# Patient Record
Sex: FEMALE | Race: WHITE | NOT HISPANIC OR LATINO | Employment: OTHER | URBAN - METROPOLITAN AREA
[De-identification: names, ages, dates, MRNs, and addresses within clinical notes are randomized per-mention and may not be internally consistent; named-entity substitution may affect disease eponyms.]

---

## 2019-03-07 ENCOUNTER — TELEPHONE (OUTPATIENT)
Dept: SCHEDULING | Facility: IMAGING CENTER | Age: 64
End: 2019-03-07

## 2019-04-09 ENCOUNTER — OFFICE VISIT (OUTPATIENT)
Dept: MEDICAL GROUP | Facility: PHYSICIAN GROUP | Age: 64
End: 2019-04-09
Payer: MEDICARE

## 2019-04-09 ENCOUNTER — TELEPHONE (OUTPATIENT)
Dept: MEDICAL GROUP | Facility: PHYSICIAN GROUP | Age: 64
End: 2019-04-09

## 2019-04-09 VITALS
HEART RATE: 77 BPM | HEIGHT: 64 IN | OXYGEN SATURATION: 97 % | SYSTOLIC BLOOD PRESSURE: 110 MMHG | WEIGHT: 138 LBS | RESPIRATION RATE: 16 BRPM | TEMPERATURE: 98.2 F | DIASTOLIC BLOOD PRESSURE: 64 MMHG | BODY MASS INDEX: 23.56 KG/M2

## 2019-04-09 DIAGNOSIS — Z78.0 POST-MENOPAUSAL: ICD-10-CM

## 2019-04-09 DIAGNOSIS — I48.91 ATRIAL FIBRILLATION, UNSPECIFIED TYPE (HCC): ICD-10-CM

## 2019-04-09 DIAGNOSIS — E78.2 MIXED HYPERLIPIDEMIA: ICD-10-CM

## 2019-04-09 DIAGNOSIS — F51.01 PRIMARY INSOMNIA: ICD-10-CM

## 2019-04-09 DIAGNOSIS — F32.A ANXIETY AND DEPRESSION: ICD-10-CM

## 2019-04-09 DIAGNOSIS — I50.30 DIASTOLIC CONGESTIVE HEART FAILURE, UNSPECIFIED HF CHRONICITY (HCC): ICD-10-CM

## 2019-04-09 DIAGNOSIS — F41.9 ANXIETY AND DEPRESSION: ICD-10-CM

## 2019-04-09 DIAGNOSIS — I10 ESSENTIAL HYPERTENSION: ICD-10-CM

## 2019-04-09 PROCEDURE — 99204 OFFICE O/P NEW MOD 45 MIN: CPT | Performed by: PHYSICIAN ASSISTANT

## 2019-04-09 RX ORDER — FUROSEMIDE 20 MG/1
20 TABLET ORAL 2 TIMES DAILY
COMMUNITY
End: 2019-04-09 | Stop reason: SDUPTHER

## 2019-04-09 RX ORDER — FUROSEMIDE 20 MG/1
20 TABLET ORAL 2 TIMES DAILY
Qty: 90 TAB | Refills: 2 | Status: SHIPPED | OUTPATIENT
Start: 2019-04-09 | End: 2019-05-31

## 2019-04-09 RX ORDER — ATORVASTATIN CALCIUM 20 MG/1
20 TABLET, FILM COATED ORAL EVERY EVENING
Qty: 90 TAB | Refills: 2 | Status: SHIPPED | OUTPATIENT
Start: 2019-04-09

## 2019-04-09 RX ORDER — METOPROLOL SUCCINATE 100 MG/1
100 TABLET, EXTENDED RELEASE ORAL DAILY
Qty: 90 TAB | Refills: 2 | Status: SHIPPED | OUTPATIENT
Start: 2019-04-09

## 2019-04-09 RX ORDER — SERTRALINE HYDROCHLORIDE 100 MG/1
100 TABLET, FILM COATED ORAL DAILY
COMMUNITY
End: 2019-04-09 | Stop reason: SDUPTHER

## 2019-04-09 RX ORDER — SERTRALINE HYDROCHLORIDE 100 MG/1
100 TABLET, FILM COATED ORAL DAILY
Qty: 90 TAB | Refills: 2 | Status: SHIPPED | OUTPATIENT
Start: 2019-04-09 | End: 2019-10-22 | Stop reason: SDUPTHER

## 2019-04-09 RX ORDER — TRAZODONE HYDROCHLORIDE 100 MG/1
100 TABLET ORAL EVERY EVENING
Qty: 90 TAB | Refills: 2 | Status: SHIPPED | OUTPATIENT
Start: 2019-04-09 | End: 2020-04-16

## 2019-04-09 RX ORDER — LISINOPRIL 20 MG/1
20 TABLET ORAL EVERY EVENING
Qty: 90 TAB | Refills: 2 | Status: SHIPPED | OUTPATIENT
Start: 2019-04-09 | End: 2022-06-01

## 2019-04-09 RX ORDER — ESTRADIOL 1 MG/1
1 TABLET ORAL DAILY
COMMUNITY
End: 2019-04-09 | Stop reason: SDUPTHER

## 2019-04-09 RX ORDER — ESTRADIOL 1 MG/1
1 TABLET ORAL DAILY
Qty: 30 TAB | Refills: 2 | Status: SHIPPED | OUTPATIENT
Start: 2019-04-09 | End: 2019-10-22

## 2019-04-09 ASSESSMENT — PATIENT HEALTH QUESTIONNAIRE - PHQ9: CLINICAL INTERPRETATION OF PHQ2 SCORE: 0

## 2019-04-09 NOTE — TELEPHONE ENCOUNTER
Pharmacist called and stated pt has been taking furosemide QD, new Rx that was sent today was BID.  Would you like Rx to be BID, or QD?

## 2019-04-09 NOTE — LETTER
[facility]  Astrid Ly P.A.-C.  [address]  [place] [address]  Fax: [phone]   Authorization for Release/Disclosure of   Protected Health Information   Name: IRAM SAVAGE : 1955 SSN: [id number]   Address: [address] [place] [address] [place] [address] Phone:    [phone] (home)    I authorize the entity listed below to release/disclose the PHI below to:   [facility]/Astrid Ly P.A.-C. and Astrid Ly P.A.-C.   Provider or Entity Name:     Address   City, State, Zip   Phone:      Fax:     Reason for request: continuity of care   Information to be released:    [  ] LAST COLONOSCOPY,  including any PATH REPORT and follow-up  [  ] LAST FIT/COLOGUARD RESULT [  ] LAST DEXA  [  ] LAST MAMMOGRAM  [  ] LAST PAP  [  ] LAST LABS [  ] RETINA EXAM REPORT  [  ] IMMUNIZATION RECORDS  [ x ] Release all info      [  ] Check here and initial the line next to each item to release ALL health information INCLUDING  _____ Care and treatment for drug and / or alcohol abuse  _____ HIV testing, infection status, or AIDS  _____ Genetic Testing    DATES OF SERVICE OR TIME PERIOD TO BE DISCLOSED: _____________  I understand and acknowledge that:  * This Authorization may be revoked at any time by you in writing, except if your health information has already been used or disclosed.  * Your health information that will be used or disclosed as a result of you signing this authorization could be re-disclosed by the recipient. If this occurs, your re-disclosed health information may no longer be protected by State or Federal laws.  * You may refuse to sign this Authorization. Your refusal will not affect your ability to obtain treatment.  * This Authorization becomes effective upon signing and will  on (date) __________.      If no date is indicated, this Authorization will  one (1) year from the signature date.    Name: Iram Savage    Signature:   Date:     2019       PLEASE FAX  REQUESTED RECORDS BACK TO: [phone]

## 2019-04-10 ENCOUNTER — HOSPITAL ENCOUNTER (OUTPATIENT)
Dept: LAB | Facility: MEDICAL CENTER | Age: 64
End: 2019-04-10
Attending: PHYSICIAN ASSISTANT
Payer: MEDICARE

## 2019-04-10 DIAGNOSIS — E78.2 MIXED HYPERLIPIDEMIA: ICD-10-CM

## 2019-04-10 DIAGNOSIS — I10 ESSENTIAL HYPERTENSION: ICD-10-CM

## 2019-04-10 LAB
BASOPHILS # BLD AUTO: 0.8 % (ref 0–1.8)
BASOPHILS # BLD: 0.08 K/UL (ref 0–0.12)
EOSINOPHIL # BLD AUTO: 0.97 K/UL (ref 0–0.51)
EOSINOPHIL NFR BLD: 10.1 % (ref 0–6.9)
ERYTHROCYTE [DISTWIDTH] IN BLOOD BY AUTOMATED COUNT: 47.2 FL (ref 35.9–50)
HCT VFR BLD AUTO: 48.3 % (ref 37–47)
HGB BLD-MCNC: 16.1 G/DL (ref 12–16)
IMM GRANULOCYTES # BLD AUTO: 0.02 K/UL (ref 0–0.11)
IMM GRANULOCYTES NFR BLD AUTO: 0.2 % (ref 0–0.9)
LYMPHOCYTES # BLD AUTO: 3.47 K/UL (ref 1–4.8)
LYMPHOCYTES NFR BLD: 36.2 % (ref 22–41)
MCH RBC QN AUTO: 33.5 PG (ref 27–33)
MCHC RBC AUTO-ENTMCNC: 33.3 G/DL (ref 33.6–35)
MCV RBC AUTO: 100.4 FL (ref 81.4–97.8)
MONOCYTES # BLD AUTO: 0.61 K/UL (ref 0–0.85)
MONOCYTES NFR BLD AUTO: 6.4 % (ref 0–13.4)
NEUTROPHILS # BLD AUTO: 4.43 K/UL (ref 2–7.15)
NEUTROPHILS NFR BLD: 46.3 % (ref 44–72)
NRBC # BLD AUTO: 0 K/UL
NRBC BLD-RTO: 0 /100 WBC
PLATELET # BLD AUTO: 225 K/UL (ref 164–446)
PMV BLD AUTO: 11.5 FL (ref 9–12.9)
RBC # BLD AUTO: 4.81 M/UL (ref 4.2–5.4)
WBC # BLD AUTO: 9.6 K/UL (ref 4.8–10.8)

## 2019-04-10 PROCEDURE — 80061 LIPID PANEL: CPT

## 2019-04-10 PROCEDURE — 80053 COMPREHEN METABOLIC PANEL: CPT

## 2019-04-10 PROCEDURE — 85025 COMPLETE CBC W/AUTO DIFF WBC: CPT

## 2019-04-10 PROCEDURE — 36415 COLL VENOUS BLD VENIPUNCTURE: CPT

## 2019-04-11 LAB
ALBUMIN SERPL BCP-MCNC: 4 G/DL (ref 3.2–4.9)
ALBUMIN/GLOB SERPL: 1.6 G/DL
ALP SERPL-CCNC: 71 U/L (ref 30–99)
ALT SERPL-CCNC: 19 U/L (ref 2–50)
ANION GAP SERPL CALC-SCNC: 8 MMOL/L (ref 0–11.9)
AST SERPL-CCNC: 21 U/L (ref 12–45)
BILIRUB SERPL-MCNC: 0.5 MG/DL (ref 0.1–1.5)
BUN SERPL-MCNC: 19 MG/DL (ref 8–22)
CALCIUM SERPL-MCNC: 8.7 MG/DL (ref 8.5–10.5)
CHLORIDE SERPL-SCNC: 108 MMOL/L (ref 96–112)
CHOLEST SERPL-MCNC: 139 MG/DL (ref 100–199)
CO2 SERPL-SCNC: 28 MMOL/L (ref 20–33)
CREAT SERPL-MCNC: 0.75 MG/DL (ref 0.5–1.4)
FASTING STATUS PATIENT QL REPORTED: NORMAL
GLOBULIN SER CALC-MCNC: 2.5 G/DL (ref 1.9–3.5)
GLUCOSE SERPL-MCNC: 86 MG/DL (ref 65–99)
HDLC SERPL-MCNC: 43 MG/DL
LDLC SERPL CALC-MCNC: 63 MG/DL
POTASSIUM SERPL-SCNC: 3.7 MMOL/L (ref 3.6–5.5)
PROT SERPL-MCNC: 6.5 G/DL (ref 6–8.2)
SODIUM SERPL-SCNC: 144 MMOL/L (ref 135–145)
TRIGL SERPL-MCNC: 164 MG/DL (ref 0–149)

## 2019-04-15 NOTE — PROGRESS NOTES
Chief Complaint   Patient presents with   • Establish Care     medication refills, pt would like to discuss trazadone       HISTORY OF THE PRESENT ILLNESS: Iram Savage is a 64 y.o. female new patient to our practice. This pleasant patient is here today to establish care and to discuss the evaluation and management of:    Patient is a pleasant 64-year-old female here today to establish care.  Patient has multiple complaints.  She tells me that she has a positive medical history for hypertension, hyperlipidemia, diastolic congestive heart failure, atrial fibrillation, postmenopausal symptoms, anxiety and depression.  Patient is requesting medication refills and would like to discuss insomnia.    She tells me recently she was seen at [facility] on 4/26/2019 and diagnosed with A. Fib and diastolic congestive heart failure.  States she was prescribed an anticoagulation medication but it was too expensive so she was provided samples of Eliquis 5 mg tab and has been taking to Eliquis 5 mg tab once daily.  States she was referred to a pulmonologist Dr. Nathan but he told her that he could not see her until a chest CT scan was completed.  Patient denies being diagnosed with pulmonary embolisms when hospitalized.  She mentions that she is also prescribed ipratropium twice a day but only takes medication intermittently.  States she has appointment with Dr. Curiel cardiologist on 4/26/2019.    Blood pressure is controlled.  Patient's blood pressure during today's appointment 110/64  mmHg.  She denies monitoring blood pressure at home.  States she takes lisinopril 20 mg tab once daily, Lasix 20 mg tab twice daily, metoprolol  mg tab once daily, and clonidine 0.1 mg tab twice daily.  Patient is also taking potassium 10 mEq tab once daily.  She mentions that she has been out of potassium for the past 3 weeks.  States she has been compliant with her other medications and experiences no side effects or complications  or medication.  Denies chest pain, shortness of breath, heart palpitations, dizziness, syncope, severe headache, vision changes, peripheral edema.    States of anxiety and depression she takes Zoloft 100 mg tab once daily.  States she has been taking this medication for several years and experience is no side effects or complications or medication.  States symptoms are managed with medication.  Denies side effects or complications of medication.  Denies homicidal or suicidal ideation.    Patient states she had a total hysterectomy in 2005 and at that time was placed on estradiol 1 mg tab once daily.  States she has been taking medication consistently since then.  No history or family history of breast cancer.  On average she has 1-2 hot flashes per day.  Patient would like to continue estradiol.  Discussed risk associated with hormone replacement.  Patient does not want to discontinue medication at this time.  Will reassess during follow-up appointment.    Patient states she is been having difficulty falling and staying asleep.  States she wakes up often.  Admits that due to lack of sleep she is feeling tired throughout the day.  Patient is inquiring about treatment options.    Past Medical History:   Diagnosis Date   • Broken heart syndrome 2014   • High cholesterol    • Hypertension    • Pancreatitis 2013   • Psychiatric problem        Past Surgical History:   Procedure Laterality Date   • BREAST IMPLANT REVISION Bilateral 12/14/2016    Procedure: BREAST IMPLANT - EXCHANGE;  Surgeon: Gabbie Orr M.D.;  Location: [facility];  Service:    • MASTOPEXY  1/28/2015    Performed by Gabbie Orr M.D. at [facility]   • BREAST IMPLANT REMOVAL  1/28/2015    Performed by Gabbie Orr M.D. at [facility]   • MAMMOPLASTY AUGMENTATION  1/28/2015    Performed by Gabbie Orr M.D. at [facility]   • GYN SURGERY  2005    HYSTERECTOMY   • OTHER ORTHOPEDIC  SURGERY      ORIF RIGHT WRIST   • PB ENLARGE BREAST      X 2   • TONSILLECTOMY      CHILD       No family status information on file.   History reviewed. No pertinent family history.    Social History   Substance Use Topics   • Smoking status: Former Smoker     Packs/day: 0.25     Types: Cigarettes     Start date: 1/1/2014   • Smokeless tobacco: Never Used   • Alcohol use Yes      Comment: 3 drinks per week       Allergies: Iodine and Sulfa drugs    Current Outpatient Prescriptions Ordered in [facility]   Medication Sig Dispense Refill   • lisinopril (PRINIVIL) 20 MG Tab Take 1 Tab by mouth every evening. 90 Tab 2   • furosemide (LASIX) 20 MG Tab Take 1 Tab by mouth 2 times a day. 90 Tab 2   • sertraline (ZOLOFT) 100 MG Tab Take 1 Tab by mouth every day. 90 Tab 2   • metoprolol SR (TOPROL XL) 100 MG TABLET SR 24 HR Take 1 Tab by mouth every day. 90 Tab 2   • atorvastatin (LIPITOR) 20 MG Tab Take 1 Tab by mouth every evening. 90 Tab 2   • estradiol (ESTRACE) 1 MG Tab Take 1 Tab by mouth every day. 30 Tab 2   • traZODone (DESYREL) 100 MG Tab Take 1 Tab by mouth every evening. 90 Tab 2   • apixaban (ELIQUIS) 5mg Tab Take 1 Tab by mouth 2 Times a Day. 120 Tab 1   • clonidine (CATAPRES) 0.1 MG Tab Take 0.1 mg by mouth 2 times a day.       No current [facility]-ordered facility-administered medications on file.        Review of Systems   Constitutional: Negative for fever, chills, weight loss and malaise/fatigue.   HENT: Negative for ear pain, nosebleeds, congestion, sore throat and neck pain.    Eyes: Negative for blurred vision.   Respiratory: Negative for cough, sputum production, shortness of breath and wheezing.    Cardiovascular: Negative for chest pain, palpitations, orthopnea and leg swelling.   Gastrointestinal: Negative for heartburn, nausea, vomiting and abdominal pain.   Genitourinary: Negative for dysuria, urgency and frequency.   Musculoskeletal: Negative for myalgias, back pain and joint pain.   Skin: Negative for rash  "and itching.   Neurological: Negative for dizziness, tingling, tremors, sensory change, focal weakness and headaches.   Endo/Heme/Allergies: Does not bruise/bleed easily.  Positive for hot flashes.  Psychiatric/Behavioral: Negative for or memory loss.   Positive for anxiety, depression, and insomnia.  All other systems reviewed and are negative except as in HPI.    Exam: /64   Pulse 77   Temp 36.8 °C (98.2 °F)   Resp 16   Ht 1.626 m (5' 4\")   Wt 62.6 kg (138 lb)   SpO2 97%  Body mass index is 23.69 kg/m².  General: Normal appearing. No distress.  HEENT: Normocephalic. Eyes conjunctiva clear lids without ptosis, pupils equal and reactive to light accommodation, ears normal shape and contour, canals are clear bilaterally, tympanic membranes are benign, nasal mucosa benign, oropharynx is without erythema, edema or exudates.   Neck: Supple without JVD or bruit. Thyroid is not enlarged.  Pulmonary: Clear to ausculation.  Normal effort. No rales, ronchi, or wheezing.  Cardiovascular: Regular rate and rhythm without murmur.   Abdomen: Soft, nontender, nondistended. Normal bowel sounds. Liver and spleen are not palpable  Neurologic: Grossly nonfocal  Lymph: No cervical, supraclavicular or axillary lymph nodes are palpable  Skin: Warm and dry.  No obvious lesions.  Musculoskeletal: Normal gait. No extremity cyanosis, clubbing, or edema.  Psych: Normal mood and affect. Alert and oriented x3. Judgment and insight is normal.      Medical decision-making and discussion:  1. Atrial fibrillation, unspecified type (HCC)  Advised patient to take Eliquis 5 mg tab twice daily.  I will need to obtain past medical records ordered.  Medical release form will be signed by patient to obtain past medical records.  Discussed importance of being compliant with medication.  Continue following up with cardiology as indicated.  Continue to monitor.    Discussed ED precautions with patient.  - apixaban (ELIQUIS) 5mg Tab; Take 1 Tab by " mouth 2 Times a Day.  Dispense: 120 Tab; Refill: 1    2. Diastolic congestive heart failure, unspecified HF chronicity (HCC)  Continue current medication regimen.  Continue to monitor.  Continue following up with cardiology as indicated.  Continue work on diet, exercise, and sleep hygiene.    - lisinopril (PRINIVIL) 20 MG Tab; Take 1 Tab by mouth every evening.  Dispense: 90 Tab; Refill: 2  - furosemide (LASIX) 20 MG Tab; Take 1 Tab by mouth 2 times a day.  Dispense: 90 Tab; Refill: 2  - metoprolol SR (TOPROL XL) 100 MG TABLET SR 24 HR; Take 1 Tab by mouth every day.  Dispense: 90 Tab; Refill: 2    3. Essential hypertension  Well-controlled. Labs as indicated. Continue antihypertensive medications. Discussed decreasing salt intake. Emphasized benefits of exercise and diet. Continue to monitor.    - lisinopril (PRINIVIL) 20 MG Tab; Take 1 Tab by mouth every evening.  Dispense: 90 Tab; Refill: 2  - furosemide (LASIX) 20 MG Tab; Take 1 Tab by mouth 2 times a day.  Dispense: 90 Tab; Refill: 2  - metoprolol SR (TOPROL XL) 100 MG TABLET SR 24 HR; Take 1 Tab by mouth every day.  Dispense: 90 Tab; Refill: 2  - CBC WITH DIFFERENTIAL; Future  - Comp Metabolic Panel; Future  - Lipid Profile; Future    4. Mixed hyperlipidemia  Labs as indicated. Continue statin medication. Continue to monitor.    - atorvastatin (LIPITOR) 20 MG Tab; Take 1 Tab by mouth every evening.  Dispense: 90 Tab; Refill: 2  - Lipid Profile; Future    5. Post-menopausal  Discussed risk associated with hormone replacement. Discussed other treatment options such as gabapentin or Effexor with patient during today's appointment. Patient does not want to stop Estrace at this time.  Will readdress during follow-up appointment on 5/21/2019.    - estradiol (ESTRACE) 1 MG Tab; Take 1 Tab by mouth every day.  Dispense: 30 Tab; Refill: 2    6. Primary insomnia  Discussed importance of healthy diet, regular exercise routine, practicing good sleep hygiene.  Patient has  been prescribed trazodone 100 mg tab advised to take a half a tablet by mouth when initiating medication.  Take medication 30-60 minutes prior to bedtime.  If sleep deprivation symptoms are not improved with a half a tablet by mouth once nightly after 1 week to increase dosage to a full tablet by mouth once nightly.  Discussed side effects and adverse reactions of medication with patient.  Patient will follow-up in 6 weeks for med jolene and to discuss lab work results.    - traZODone (DESYREL) 100 MG Tab; Take 1 Tab by mouth every evening.  Dispense: 90 Tab; Refill: 2    7. Anxiety and depression  Patient is feeling well on current medications. Will continue. Denies any suicidal or homicidal ideation. Emphasized importance of healthy diet and exercise. Discussed that should the patient have any symptoms they should call suicide prevention hotline or report to the emergency room immediately.      Please note that this dictation was created using voice recognition software. I have made every reasonable attempt to correct obvious errors, but I expect that there are errors of grammar and possibly content that I did not discover before finalizing the note.      Return in about 6 weeks (around 5/21/2019).

## 2019-04-19 DIAGNOSIS — D53.9 ANEMIA, MACROCYTIC: ICD-10-CM

## 2019-04-26 ENCOUNTER — HOSPITAL ENCOUNTER (OUTPATIENT)
Dept: LAB | Facility: MEDICAL CENTER | Age: 64
End: 2019-04-26
Attending: NURSE PRACTITIONER
Payer: MEDICARE

## 2019-04-26 ENCOUNTER — HOSPITAL ENCOUNTER (OUTPATIENT)
Dept: LAB | Facility: MEDICAL CENTER | Age: 64
End: 2019-04-26
Attending: PHYSICIAN ASSISTANT
Payer: MEDICARE

## 2019-04-26 DIAGNOSIS — D53.9 ANEMIA, MACROCYTIC: ICD-10-CM

## 2019-04-26 LAB
FOLATE SERPL-MCNC: >23.6 NG/ML
VIT B12 SERPL-MCNC: 798 PG/ML (ref 211–911)

## 2019-04-26 PROCEDURE — 36415 COLL VENOUS BLD VENIPUNCTURE: CPT

## 2019-04-26 PROCEDURE — 82746 ASSAY OF FOLIC ACID SERUM: CPT

## 2019-04-26 PROCEDURE — 82607 VITAMIN B-12: CPT

## 2019-05-13 ENCOUNTER — HOSPITAL ENCOUNTER (OUTPATIENT)
Dept: LAB | Facility: MEDICAL CENTER | Age: 64
End: 2019-05-13
Attending: NURSE PRACTITIONER
Payer: MEDICARE

## 2019-05-13 LAB — 25(OH)D3 SERPL-MCNC: 26 NG/ML (ref 30–100)

## 2019-05-13 PROCEDURE — 36415 COLL VENOUS BLD VENIPUNCTURE: CPT

## 2019-05-13 PROCEDURE — 82306 VITAMIN D 25 HYDROXY: CPT

## 2019-05-21 ENCOUNTER — APPOINTMENT (OUTPATIENT)
Dept: MEDICAL GROUP | Facility: PHYSICIAN GROUP | Age: 64
End: 2019-05-21
Payer: OTHER MISCELLANEOUS

## 2019-05-31 ENCOUNTER — OFFICE VISIT (OUTPATIENT)
Dept: MEDICAL GROUP | Facility: PHYSICIAN GROUP | Age: 64
End: 2019-05-31
Payer: MEDICARE

## 2019-05-31 VITALS
RESPIRATION RATE: 14 BRPM | HEART RATE: 75 BPM | DIASTOLIC BLOOD PRESSURE: 80 MMHG | OXYGEN SATURATION: 95 % | HEIGHT: 64 IN | WEIGHT: 137.2 LBS | TEMPERATURE: 97.5 F | SYSTOLIC BLOOD PRESSURE: 140 MMHG | BODY MASS INDEX: 23.42 KG/M2

## 2019-05-31 DIAGNOSIS — I48.91 ATRIAL FIBRILLATION, UNSPECIFIED TYPE (HCC): ICD-10-CM

## 2019-05-31 DIAGNOSIS — D68.59 HYPERCOAGULABLE STATE (HCC): ICD-10-CM

## 2019-05-31 DIAGNOSIS — I10 ESSENTIAL HYPERTENSION: ICD-10-CM

## 2019-05-31 DIAGNOSIS — E55.9 VITAMIN D DEFICIENCY: ICD-10-CM

## 2019-05-31 PROCEDURE — 99214 OFFICE O/P EST MOD 30 MIN: CPT | Performed by: PHYSICIAN ASSISTANT

## 2019-06-17 PROBLEM — E55.9 VITAMIN D DEFICIENCY: Status: ACTIVE | Noted: 2019-06-17

## 2019-06-17 PROBLEM — I10 HYPERTENSION: Status: ACTIVE | Noted: 2019-06-17

## 2019-06-17 PROBLEM — D68.59 HYPERCOAGULABLE STATE (HCC): Status: ACTIVE | Noted: 2019-06-17

## 2019-06-17 PROBLEM — I48.91 ATRIAL FIBRILLATION (HCC): Status: ACTIVE | Noted: 2019-06-17

## 2019-06-17 RX ORDER — AMLODIPINE BESYLATE 5 MG/1
5 TABLET ORAL DAILY
COMMUNITY
End: 2022-06-01

## 2019-06-17 NOTE — PROGRESS NOTES
Chief Complaint   Patient presents with   • Follow-Up     wanted to  from last visit and go over lab results        HISTORY OF PRESENT ILLNESS: Iram Savage is an established 64 y.o. female here to discuss the evaluation and management of:      Patient is a pleasant 64-year-old female here today to follow-up on A. fib and lab work.  Patient is following up with cardiology at [facility].  She is currently taking Xarelto 5 mg tab twice daily as well as metoprolol succinate extended release 100 mg tablet once daily.  She tells me that she is compliant with medications and experiences no side effects or complications from medications.  During her last appointment patient was discussing a repeat chest CT scan.  She tells me CT scan was repeated and no further work-up was indicated.  Patient is following up with a pulmonologist as well.  Per chart review and reviewing cardiology note to wear a Holter monitor for 1 month but due to travels patient is not able to do this at this time.  Patient states overall she is feeling well.      Blood pressure is controlled.  Patient's blood pressure during today's appointment 140/80 on Hg.  She is currently taking lisinopril 20 mg tab once daily, amlodipine 5 mg tab once daily, metoprolol succinate  mg tab once daily.  States she is compliant with medications and experience is no side effects or complications or medications.  Patient is no longer taking Lasix or clonidine. Denies chest pain, shortness of breath, heart palpitations, dizziness, syncope, severe headache, vision changes, peripheral edema.     Positive for vitamin D deficiency.  Recent lab work completed and vitamin D was 26.    Patient Active Problem List    Diagnosis Date Noted   • Atrial fibrillation (HCC) 06/17/2019   • Encounter for cosmetic surgery 01/28/2015       Allergies:Iodine and Sulfa drugs    Current Outpatient Prescriptions   Medication Sig Dispense Refill   • lisinopril (PRINIVIL) 20  MG Tab Take 1 Tab by mouth every evening. 90 Tab 2   • sertraline (ZOLOFT) 100 MG Tab Take 1 Tab by mouth every day. 90 Tab 2   • metoprolol SR (TOPROL XL) 100 MG TABLET SR 24 HR Take 1 Tab by mouth every day. 90 Tab 2   • atorvastatin (LIPITOR) 20 MG Tab Take 1 Tab by mouth every evening. 90 Tab 2   • estradiol (ESTRACE) 1 MG Tab Take 1 Tab by mouth every day. 30 Tab 2   • traZODone (DESYREL) 100 MG Tab Take 1 Tab by mouth every evening. 90 Tab 2   • apixaban (ELIQUIS) 5mg Tab Take 1 Tab by mouth 2 Times a Day. 120 Tab 1   • clonidine (CATAPRES) 0.1 MG Tab Take 0.1 mg by mouth 2 times a day.       No current facility-administered medications for this visit.        Social History   Substance Use Topics   • Smoking status: Former Smoker     Packs/day: 0.25     Types: Cigarettes     Start date: 2014   • Smokeless tobacco: Never Used   • Alcohol use Yes      Comment: 3 drinks per week       Family Status   Relation Status   • Mo    • Fa    • Manuel Alive   • Manuel Alive     Family History   Problem Relation Age of Onset   • Heart Disease Mother    • Diabetes Mother    • Hypertension Mother    • Hypertension Father    • Kidney Disease Father    • No Known Problems Daughter    • No Known Problems Daughter        ROS:  Review of Systems   Constitutional: Negative for fever, chills, weight loss and malaise/fatigue.   HENT: Negative for ear pain, nosebleeds, congestion, sore throat and neck pain.    Eyes: Negative for blurred vision.   Respiratory: Negative for cough, sputum production, shortness of breath and wheezing.    Cardiovascular: Negative for chest pain, palpitations, orthopnea and leg swelling.   Gastrointestinal: Negative for heartburn, nausea, vomiting and abdominal pain.   Genitourinary: Negative for dysuria, urgency and frequency.   Musculoskeletal: Negative for myalgias, back pain and joint pain.   Skin: Negative for rash and itching.   Neurological: Negative for dizziness, tingling, tremors,  "sensory change, focal weakness and headaches.   Endo/Heme/Allergies: Does not bruise/bleed easily.   Psychiatric/Behavioral: Negative for depression, suicidal ideas and memory loss.  The patient is not nervous/anxious and does not have insomnia.    All other systems reviewed and are negative except as in HPI.    Exam: /80 (BP Location: Left arm, Patient Position: Sitting, BP Cuff Size: Adult)   Pulse 75   Temp 36.4 °C (97.5 °F) (Temporal)   Resp 14   Ht 1.626 m (5' 4\")   Wt 62.2 kg (137 lb 3.2 oz)   SpO2 95%  Body mass index is 23.55 kg/m².  General: Normal appearing. No distress.  HEENT: Normocephalic. Eyes conjunctiva clear lids without ptosis, pupils equal and reactive to light accommodation, ears normal shape and contour, canals are clear bilaterally, tympanic membranes are benign, nasal mucosa benign, oropharynx is without erythema, edema or exudates.   Neck: Supple without JVD or bruit. Thyroid is not enlarged.  Pulmonary: Clear to ausculation.  Normal effort. No rales, ronchi, or wheezing.  Cardiovascular: Regular rate and rhythm without murmur. Carotid and radial pulses are intact and equal bilaterally.  Abdomen: Soft, nontender, nondistended. Normal bowel sounds. Liver and spleen are not palpable  Neurologic: Grossly nonfocal.  Cranial nerves are normal. DTR's normal and symmetric.  Lymph: No cervical, supraclavicular or axillary lymph nodes are palpable  Skin: Warm and dry.  No rashes or suspicious skin lesions.  Musculoskeletal: Normal gait. No extremity cyanosis, clubbing, or edema.  Psych: Normal mood and affect. Alert and oriented x3. Judgment and insight is normal.    Medical decision-making and discussion:  1. Atrial fibrillation, unspecified type (HCC)  2. Hypercoagulable state (HCC)  Reviewed cardiology note.  Patient is following up with cardiology closely.  Holter monitor was suggested for patient to wear for 1 month the patient is able to do at this time due to upcoming travels.  " Patient states she completed a repeat CT scan and no further work-up was indicated.  She tells me that she is taking Xarelto as prescribed as well as her other medications.  Denies side effects or complications or medication.  States overall she is feeling well.    3. Vitamin D deficiency  Reviewed recent lab work.  Vitamin D was 26.  Advised patient take over-the-counter 1-2000 units of vitamin D once daily.  Continue to monitor.    4. Essential hypertension  Well-controlled. Labs as indicated. Continue antihypertensive medications. Discussed decreasing salt intake. Emphasized benefits of exercise and diet. Continue to monitor.        Please note that this dictation was created using voice recognition software. I have made every reasonable attempt to correct obvious errors, but I expect that there are errors of grammar and possibly content that I did not discover before finalizing the note.    Assessment/Plan:  1. Atrial fibrillation, unspecified type (HCC)     2. Vitamin D deficiency         No Follow-up on file.

## 2019-10-18 ENCOUNTER — TELEPHONE (OUTPATIENT)
Dept: MEDICAL GROUP | Facility: PHYSICIAN GROUP | Age: 64
End: 2019-10-18

## 2019-10-18 RX ORDER — SERTRALINE HYDROCHLORIDE 100 MG/1
100 TABLET, FILM COATED ORAL DAILY
Qty: 90 TAB | Refills: 0 | Status: CANCELLED | OUTPATIENT
Start: 2019-10-18

## 2019-10-18 NOTE — TELEPHONE ENCOUNTER
"Phone Number Called: [phone] (home)       Call outcome: spoke to patient regarding message below    Message: Pt called to ask for medication refill on generic Zoloft. She also had a question about having hot flashes. Pt stated that Astrid told Pt to stop taking Estradiol but since then she has been experiencing horrible hot flashes- she said she is sometimes \"dripping wet\". Pt was wondering if there is some medication other than the estradiol she was instructed to no longer take to help her with the hot flashes. Pt also wanted to update Astrid on her current health status- she is currently in [facility] because on 10/17 she had a heart ablation done.   "

## 2019-10-22 ENCOUNTER — OFFICE VISIT (OUTPATIENT)
Dept: MEDICAL GROUP | Facility: PHYSICIAN GROUP | Age: 64
End: 2019-10-22
Payer: MEDICARE

## 2019-10-22 VITALS
BODY MASS INDEX: 22.84 KG/M2 | RESPIRATION RATE: 20 BRPM | TEMPERATURE: 98.3 F | DIASTOLIC BLOOD PRESSURE: 70 MMHG | HEIGHT: 64 IN | WEIGHT: 133.8 LBS | SYSTOLIC BLOOD PRESSURE: 120 MMHG | HEART RATE: 83 BPM | OXYGEN SATURATION: 95 %

## 2019-10-22 DIAGNOSIS — Z12.31 ENCOUNTER FOR SCREENING MAMMOGRAM FOR BREAST CANCER: ICD-10-CM

## 2019-10-22 DIAGNOSIS — F32.A ANXIETY AND DEPRESSION: ICD-10-CM

## 2019-10-22 DIAGNOSIS — N95.9 POST MENOPAUSAL PROBLEMS: ICD-10-CM

## 2019-10-22 DIAGNOSIS — F41.9 ANXIETY AND DEPRESSION: ICD-10-CM

## 2019-10-22 PROCEDURE — 99214 OFFICE O/P EST MOD 30 MIN: CPT | Performed by: PHYSICIAN ASSISTANT

## 2019-10-22 RX ORDER — SERTRALINE HYDROCHLORIDE 100 MG/1
100 TABLET, FILM COATED ORAL DAILY
Qty: 90 TAB | Refills: 3 | Status: SHIPPED | OUTPATIENT
Start: 2019-10-22 | End: 2020-10-20 | Stop reason: SDUPTHER

## 2019-10-22 RX ORDER — GABAPENTIN 100 MG/1
100 CAPSULE ORAL 3 TIMES DAILY
Qty: 90 CAP | Refills: 2 | Status: SHIPPED | OUTPATIENT
Start: 2019-10-22 | End: 2020-10-26

## 2019-10-22 NOTE — LETTER
[facility]  Astrid Ly P.A.-C.  [address]  [place] [address]  Fax: [phone]   Authorization for Release/Disclosure of   Protected Health Information   Name: IRAM SAVAGE : 1955 SSN: [id number]   Address: [address] [place] [address] [place] [address] Phone:    [phone] (home)    I authorize the entity listed below to release/disclose the PHI below to:   [facility]/Astrid Ly P.A.-C. and Astrid Ly P.A.-C.   Provider or Entity Name:     Address   City, State, Zip   Phone:      Fax:     Reason for request: continuity of care   Information to be released:    [  ] LAST COLONOSCOPY,  including any PATH REPORT and follow-up  [  ] LAST FIT/COLOGUARD RESULT [  ] LAST DEXA  [  ] LAST MAMMOGRAM  [  ] LAST PAP  [  ] LAST LABS [  ] RETINA EXAM REPORT  [  ] IMMUNIZATION RECORDS  [  ] Release all info      [  ] Check here and initial the line next to each item to release ALL health information INCLUDING  _____ Care and treatment for drug and / or alcohol abuse  _____ HIV testing, infection status, or AIDS  _____ Genetic Testing    DATES OF SERVICE OR TIME PERIOD TO BE DISCLOSED: _____________  I understand and acknowledge that:  * This Authorization may be revoked at any time by you in writing, except if your health information has already been used or disclosed.  * Your health information that will be used or disclosed as a result of you signing this authorization could be re-disclosed by the recipient. If this occurs, your re-disclosed health information may no longer be protected by State or Federal laws.  * You may refuse to sign this Authorization. Your refusal will not affect your ability to obtain treatment.  * This Authorization becomes effective upon signing and will  on (date) __________.      If no date is indicated, this Authorization will  one (1) year from the signature date.    Name: Iram Savage    Signature:   Date:     10/22/2019       PLEASE FAX  REQUESTED RECORDS BACK TO: [phone]

## 2019-10-22 NOTE — LETTER
[facility]  Astrid Ly P.A.-C.  [address]  [place] [address]  Fax: [phone]   Authorization for Release/Disclosure of   Protected Health Information   Name: IRAM SAVAGE : 1955 SSN: [id number]   Address: [address] [place] [address] [place] [address] Phone:    [phone] (home)    I authorize the entity listed below to release/disclose the PHI below to:   [facility]/Astrid Ly P.A.-C. and Astrid Ly P.A.-C.   Provider or Entity Name:     Address   City, State, Zip   Phone:      Fax:     Reason for request: continuity of care   Information to be released:    [  ] LAST COLONOSCOPY,  including any PATH REPORT and follow-up  [  ] LAST FIT/COLOGUARD RESULT [  ] LAST DEXA  [  ] LAST MAMMOGRAM  [  ] LAST PAP  [  ] LAST LABS [  ] RETINA EXAM REPORT  [  ] IMMUNIZATION RECORDS  [  ] Release all info      [  ] Check here and initial the line next to each item to release ALL health information INCLUDING  _____ Care and treatment for drug and / or alcohol abuse  _____ HIV testing, infection status, or AIDS  _____ Genetic Testing    DATES OF SERVICE OR TIME PERIOD TO BE DISCLOSED: _____________  I understand and acknowledge that:  * This Authorization may be revoked at any time by you in writing, except if your health information has already been used or disclosed.  * Your health information that will be used or disclosed as a result of you signing this authorization could be re-disclosed by the recipient. If this occurs, your re-disclosed health information may no longer be protected by State or Federal laws.  * You may refuse to sign this Authorization. Your refusal will not affect your ability to obtain treatment.  * This Authorization becomes effective upon signing and will  on (date) __________.      If no date is indicated, this Authorization will  one (1) year from the signature date.    Name: Iram Savage    Signature:   Date:     10/22/2019       PLEASE FAX  REQUESTED RECORDS BACK TO: [phone]

## 2019-10-23 NOTE — PROGRESS NOTES
Chief Complaint   Patient presents with   • Medication Refill     Zoloft    • Follow-Up     Has been having 4-6 hot flashes- after she stopped estradiol        HISTORY OF PRESENT ILLNESS: Iram Savage is an established 64 y.o. female here to discuss the evaluation and management of:    Patient is a pleasant 64-year-old female here today to discuss anxiety/depression and hot flashes.  She is requesting a refill for Zoloft 100 mg tab once daily.  She tells me that she ran out of medication 1 month ago and has noticed since she ran out of medication she is starting to feel more anxious and also starting to feel depressed.  She denies homicidal or suicidal ideation.  She tells me when she was on medication she did not experience unpleasant side effects or complications of medication.    She mentions for years she has had difficulty falling and staying asleep.  States she experiences racing thoughts.  She also tells me she is experiencing hot flashes 2-3 times a night and on average 4-6 times during the day.  Patient is inquiring about treatment options.  In the past patient was taking estradiol, but medication was discontinued several months ago.  She admits that she uses a fan at night.  States she keeps her sliding glass door open for the cold air and uses light blankets.  States she layers closed.  Patient is 64 and postmenopausal.      Patient Active Problem List    Diagnosis Date Noted   • Atrial fibrillation (HCC) 06/17/2019   • Hypertension 06/17/2019   • Hypercoagulable state (HCC) 06/17/2019   • Vitamin D deficiency 06/17/2019   • Encounter for cosmetic surgery 01/28/2015       Allergies:Iodine and Sulfa drugs    Current Outpatient Medications   Medication Sig Dispense Refill   • sertraline (ZOLOFT) 100 MG Tab Take 1 Tab by mouth every day. 90 Tab 3   • gabapentin (NEURONTIN) 100 MG Cap Take 1 Cap by mouth 3 times a day. 90 Cap 2   • amLODIPine (NORVASC) 5 MG Tab Take 5 mg by mouth every day.     •  lisinopril (PRINIVIL) 20 MG Tab Take 1 Tab by mouth every evening. (Patient taking differently: Take 20 mg by mouth two times a day may change times on alt/days.) 90 Tab 2   • metoprolol SR (TOPROL XL) 100 MG TABLET SR 24 HR Take 1 Tab by mouth every day. 90 Tab 2   • atorvastatin (LIPITOR) 20 MG Tab Take 1 Tab by mouth every evening. 90 Tab 2   • traZODone (DESYREL) 100 MG Tab Take 1 Tab by mouth every evening. 90 Tab 2   • apixaban (ELIQUIS) 5mg Tab Take 1 Tab by mouth 2 Times a Day. 120 Tab 1     No current facility-administered medications for this visit.        Social History     Tobacco Use   • Smoking status: Former Smoker     Packs/day: 0.25     Types: Cigarettes     Start date: 2014   • Smokeless tobacco: Never Used   Substance Use Topics   • Alcohol use: Yes     Comment: 3 drinks per week   • Drug use: No       Family Status   Relation Name Status   • Mo     • Fa     • Manuel  Alive   • Manuel  Alive     Family History   Problem Relation Age of Onset   • Heart Disease Mother    • Diabetes Mother    • Hypertension Mother    • Hypertension Father    • Kidney Disease Father    • No Known Problems Daughter    • No Known Problems Daughter        ROS:  Review of Systems   Constitutional: Negative for fever, chills, weight loss and malaise/fatigue.   HENT: Negative for ear pain, nosebleeds, congestion, sore throat and neck pain.    Eyes: Negative for blurred vision.   Respiratory: Negative for cough, sputum production, shortness of breath and wheezing.    Cardiovascular: Negative for chest pain, palpitations, orthopnea and leg swelling.   Gastrointestinal: Negative for heartburn, nausea, vomiting and abdominal pain.   Genitourinary: Negative for dysuria, urgency and frequency.   Musculoskeletal: Negative for myalgias, back pain and joint pain.   Skin: Negative for rash and itching.   Neurological: Negative for dizziness, tingling, tremors, sensory change, focal weakness and headaches.  "  Endo/Heme/Allergies: Does not bruise/bleed easily.  Positive for hot flashes.  Psychiatric/Behavioral: Negative for suicidal ideas and memory loss.  Positive for anxiety, depression, and sleep deprivation.  All other systems reviewed and are negative except as in HPI.    Exam: /70 (BP Location: Left arm, Patient Position: Sitting, BP Cuff Size: Adult)   Pulse 83   Temp 36.8 °C (98.3 °F) (Temporal)   Resp 20   Ht 1.626 m (5' 4\")   Wt 60.7 kg (133 lb 12.8 oz)   SpO2 95%  Body mass index is 22.97 kg/m².  General: Normal appearing. No distress.  HEENT: Normocephalic. Eyes conjunctiva clear lids without ptosis, ears normal shape and contour.  Neck: Supple without JVD. Thyroid is not enlarged.  Pulmonary: Clear to ausculation.  Normal effort. No rales, ronchi, or wheezing.  Cardiovascular: Regular rate and rhythm without murmur.  Abdomen: Soft, nontender, nondistended. Normal bowel sounds. Liver and spleen are not palpable  Neurologic: Grossly nonfocal.  Cranial nerves are normal.   Lymph: No cervical or supraclavicular nodes are palpable  Skin: Warm and dry.  No rashes or suspicious skin lesions.  Musculoskeletal: Normal gait. No extremity cyanosis, clubbing, or edema.  Psych: Normal mood and affect. Alert and oriented x3. Judgment and insight is normal.    Medical decision-making and discussion:  1. Anxiety and depression  Refilled Zoloft.  Advised patient she may experience side effects for 2+ weeks but side effect symptoms should subside and she start on the benefits of the medication around weeks 4-6.  Continue work on diet, exercise, and sleep hygiene.  Advised patient to continue developing healthy coping mechanisms for stress anxiety.    Denies any suicidal or homicidal ideation. Discussed that should the patient have any symptoms they should call suicide prevention hotline or report to the emergency room immediately.    - sertraline (ZOLOFT) 100 MG Tab; Take 1 Tab by mouth every day.  Dispense: 90 " Tab; Refill: 3    2. Post menopausal problems  During today's appointment patient has been prescribed gabapentin 100 mg And advised to take 1 cap by mouth once nightly.  Discussed with patient if she does not experience alleviation of hot flash symptoms on this dosage that she can increase medication to 2 caps by mouth once nightly or 1 cap by mouth once nightly and 1 cap in the morning.  Discussed several different regimens with patient.  Advised patient she will have to find a regimen that works well for her.  Advised patient she can contact me if she finds that she needs more than 300 mg in a 24-hour span.  Patient agreed to plan.    She tells me that she will be going out of town for the winter months and will not be able to follow up in regards to medication.  Patient will contact me if she has any concerns or if dosage needs to be increased.    - gabapentin (NEURONTIN) 100 MG Cap; Take 1 Cap by mouth 3 times a day.  Dispense: 90 Cap; Refill: 2    3. Encounter for screening mammogram for breast cancer    - MA-SCREEN MAMMO W/CAD-BILAT; Future      Please note that this dictation was created using voice recognition software. I have made every reasonable attempt to correct obvious errors, but I expect that there are errors of grammar and possibly content that I did not discover before finalizing the note.    Assessment/Plan:  1. Anxiety and depression  sertraline (ZOLOFT) 100 MG Tab   2. Post menopausal problems  gabapentin (NEURONTIN) 100 MG Cap   3. Encounter for screening mammogram for breast cancer  MA-SCREEN MAMMO W/CAD-BILAT       No follow-ups on file.

## 2019-10-28 ENCOUNTER — TELEPHONE (OUTPATIENT)
Dept: MEDICAL GROUP | Facility: PHYSICIAN GROUP | Age: 64
End: 2019-10-28

## 2019-10-28 NOTE — TELEPHONE ENCOUNTER
VOICEMAIL  1. Caller Name: Iram Savage  Call Back Number: [phone] (home)       2. Message: Pt is asking for an order to be placed for a bone density scan. Pt asked if the order can be placed at the same place she is getting her mammogram.    3. Patient approves office to leave a detailed voicemail/Aryngahart message: N\A

## 2019-10-29 DIAGNOSIS — Z78.0 POSTMENOPAUSAL ESTROGEN DEFICIENCY: ICD-10-CM

## 2019-10-29 NOTE — TELEPHONE ENCOUNTER
Phone Number Called: [phone] (home)       Call outcome: left message for patient to call back regarding message below    Message: Regarding Astrid's note.

## 2019-10-31 ENCOUNTER — HOSPITAL ENCOUNTER (OUTPATIENT)
Dept: RADIOLOGY | Facility: MEDICAL CENTER | Age: 64
End: 2019-10-31
Attending: PHYSICIAN ASSISTANT
Payer: MEDICARE

## 2019-10-31 DIAGNOSIS — Z12.31 ENCOUNTER FOR SCREENING MAMMOGRAM FOR BREAST CANCER: ICD-10-CM

## 2019-10-31 DIAGNOSIS — Z78.0 POSTMENOPAUSAL ESTROGEN DEFICIENCY: ICD-10-CM

## 2019-10-31 PROCEDURE — 77063 BREAST TOMOSYNTHESIS BI: CPT

## 2019-10-31 PROCEDURE — 77080 DXA BONE DENSITY AXIAL: CPT

## 2019-11-04 ENCOUNTER — HOSPITAL ENCOUNTER (OUTPATIENT)
Dept: RADIOLOGY | Facility: MEDICAL CENTER | Age: 64
End: 2019-11-04

## 2020-04-16 DIAGNOSIS — F51.01 PRIMARY INSOMNIA: ICD-10-CM

## 2020-04-16 RX ORDER — TRAZODONE HYDROCHLORIDE 100 MG/1
TABLET ORAL
Qty: 90 TAB | Refills: 0 | Status: SHIPPED | OUTPATIENT
Start: 2020-04-16 | End: 2020-10-20 | Stop reason: SDUPTHER

## 2020-10-20 DIAGNOSIS — F51.01 PRIMARY INSOMNIA: ICD-10-CM

## 2020-10-20 DIAGNOSIS — F41.9 ANXIETY AND DEPRESSION: ICD-10-CM

## 2020-10-20 DIAGNOSIS — F32.A ANXIETY AND DEPRESSION: ICD-10-CM

## 2020-10-20 RX ORDER — SERTRALINE HYDROCHLORIDE 100 MG/1
100 TABLET, FILM COATED ORAL DAILY
Qty: 30 TAB | Refills: 0 | Status: SHIPPED | OUTPATIENT
Start: 2020-10-20 | End: 2020-10-26 | Stop reason: SDUPTHER

## 2020-10-20 RX ORDER — TRAZODONE HYDROCHLORIDE 100 MG/1
TABLET ORAL
Qty: 30 TAB | Refills: 0 | Status: SHIPPED | OUTPATIENT
Start: 2020-10-20 | End: 2020-10-26 | Stop reason: SDUPTHER

## 2020-10-20 NOTE — TELEPHONE ENCOUNTER
Received request via: Patient    Was the patient seen in the last year in this department? Yes    Does the patient have an active prescription (recently filled or refills available) for medication(s) requested? No       Pt is out of state due to a family crisis, and will need to have these two meds filled at a [facility] in [facility]

## 2020-10-26 ENCOUNTER — TELEMEDICINE (OUTPATIENT)
Dept: MEDICAL GROUP | Facility: PHYSICIAN GROUP | Age: 65
End: 2020-10-26
Payer: MEDICARE

## 2020-10-26 VITALS
BODY MASS INDEX: 22.97 KG/M2 | DIASTOLIC BLOOD PRESSURE: 72 MMHG | TEMPERATURE: 97 F | SYSTOLIC BLOOD PRESSURE: 138 MMHG | HEIGHT: 64 IN | RESPIRATION RATE: 18 BRPM

## 2020-10-26 DIAGNOSIS — E55.9 VITAMIN D DEFICIENCY: ICD-10-CM

## 2020-10-26 DIAGNOSIS — F32.A ANXIETY AND DEPRESSION: ICD-10-CM

## 2020-10-26 DIAGNOSIS — E78.2 MIXED HYPERLIPIDEMIA: ICD-10-CM

## 2020-10-26 DIAGNOSIS — I10 ESSENTIAL HYPERTENSION: ICD-10-CM

## 2020-10-26 DIAGNOSIS — F41.9 ANXIETY AND DEPRESSION: ICD-10-CM

## 2020-10-26 DIAGNOSIS — M85.89 OSTEOPENIA OF MULTIPLE SITES: ICD-10-CM

## 2020-10-26 DIAGNOSIS — F51.01 PRIMARY INSOMNIA: ICD-10-CM

## 2020-10-26 DIAGNOSIS — Z98.890 H/O CARDIAC RADIOFREQUENCY ABLATION: ICD-10-CM

## 2020-10-26 PROCEDURE — 99214 OFFICE O/P EST MOD 30 MIN: CPT | Mod: 95,CR | Performed by: PHYSICIAN ASSISTANT

## 2020-10-26 RX ORDER — ZAFIRLUKAST 20 MG/1
20 TABLET, FILM COATED ORAL
COMMUNITY
End: 2022-06-01

## 2020-10-26 RX ORDER — TRAZODONE HYDROCHLORIDE 100 MG/1
100 TABLET ORAL NIGHTLY
COMMUNITY
End: 2020-10-26

## 2020-10-26 RX ORDER — HYDROCHLOROTHIAZIDE 12.5 MG/1
TABLET ORAL
COMMUNITY
End: 2020-10-26

## 2020-10-26 RX ORDER — HYDROCHLOROTHIAZIDE 12.5 MG/1
12.5 TABLET ORAL PRN
COMMUNITY
End: 2022-06-01

## 2020-10-26 RX ORDER — SERTRALINE HYDROCHLORIDE 100 MG/1
100 TABLET, FILM COATED ORAL DAILY
Qty: 90 TAB | Refills: 3 | Status: SHIPPED | OUTPATIENT
Start: 2020-10-26 | End: 2021-05-07 | Stop reason: SDUPTHER

## 2020-10-26 RX ORDER — HYDROCHLOROTHIAZIDE 12.5 MG/1
12.5 TABLET ORAL
COMMUNITY
End: 2020-10-26

## 2020-10-26 RX ORDER — MULTIVIT-MIN/IRON/FOLIC ACID/K 18-600-40
1 CAPSULE ORAL DAILY
COMMUNITY

## 2020-10-26 RX ORDER — TRAZODONE HYDROCHLORIDE 100 MG/1
TABLET ORAL
Qty: 90 TAB | Refills: 3 | Status: SHIPPED | OUTPATIENT
Start: 2020-10-26 | End: 2021-05-07 | Stop reason: SDUPTHER

## 2020-10-26 NOTE — PROGRESS NOTES
Virtual Visit: Established Patient   This visit was conducted via Zoom using secure and encrypted videoconferencing technology. The patient was in a private location in the state of [place].    The patient's identity was confirmed and verbal consent was obtained for this virtual visit.    Subjective:   CC: Medication management  Chief Complaint   Patient presents with   • Medication Management       Iram Savage is a 65 y.o. female presenting for evaluation and management of:    Anxiety and depression  Stable problem.  Patient takes Zoloft 100 mg tab once daily.  States she is compliant with medication and is experiencing side effects or complications of medication.  Patient is requesting refill.  She denies homicidal or suicidal ideation.    Essential hypertension  Stable problem.  Patient follows up with cardiologist.  She tells me that her cardiologist works at [place].  She tells me she is taking antihypertensive medications as prescribed.  States she takes Toprol 100 mg extended release tab once daily, amlodipine 5 mg tab once daily, lisinopril 20 mg twice daily, and hydrochlorothiazide 12.5 mg tabs as needed.  States she was advised to take hydrochlorothiazide if systolic is greater than 140 and diastolic is greater than 90.  She denies chest pain, shortness breath, heart palpitations, dizziness, syncope, sweating, vision changes, peripheral edema.    Primary insomnia  Chronic but stable problem.  Patient states she takes trazodone 100 mg tab once nightly.  States she is compliant with medication and experiences no side effects or complications medication.  States on average she gets 8 hours of sleep per night.  She admits that her sleep has been disrupted but states that she is currently in [place] helping take care of her aunts and this has been stressful.  Patient is requesting a refill for trazodone.    Vitamin D deficiency  Chronic problem.  Unstable.  Patient states her cardiologist who  told her that she has vitamin D deficiency.  States she is taking 2000 units of vitamin D once daily.    Mixed hyperlipidemia  Chronic but stable problem.  Patient is taking atorvastatin 20 mg tab once daily.  Denies side effects or complications from medication.  States she takes medication compliantly.  Medications managed by her cardiologist.  Denies myalgias.    Osteopenia of multiple sites  DEXA scan in 2018 indicated osteopenia of multiple sites.  Patient states she recently started taking vitamin D.  She feels she gets adequate calcium in diet.  Denies recurrent fractures.    H/O cardiac radiofrequency ablation  Historical.  Patient is doing well.  She follows up with cardiology closely.  She tells me in October 2019 she had a cardiac ablation and in January 2020 Eliquis was discontinued by her cardiologist.  Patient is feeling well.      ROS   Denies any recent fevers or chills. No nausea or vomiting. No chest pains or shortness of breath.     Allergies   Allergen Reactions   • Iodine Anaphylaxis     Topical/digested has allergic reaction    • Sulfa Drugs Hives and Nausea       Current medicines (including changes today)  Current Outpatient Medications   Medication Sig Dispense Refill   • hydroCHLOROthiazide (HYDRODIURIL) 12.5 MG tablet Take 12.5 mg by mouth as needed.     • Cholecalciferol (VITAMIN D) 50 MCG (2000 UT) Cap Take 1 Dose by mouth every day.     • sertraline (ZOLOFT) 100 MG Tab Take 1 Tab by mouth every day. 90 Tab 3   • traZODone (DESYREL) 100 MG Tab TAKE 1 TABLET BY MOUTH ONCE DAILY IN THE EVENING 90 Tab 3   • Lactobacillus (PROBIOTIC ACIDOPHILUS PO) Take 2 Caps by mouth.     • albuterol (PROVENTIL) 2.5mg/0.5ml Nebu Soln Inhale 2.5 mg by mouth.     • zafirlukast (ACCOLATE) 20 MG Tab Take 20 mg by mouth.     • amLODIPine (NORVASC) 5 MG Tab Take 5 mg by mouth every day.     • lisinopril (PRINIVIL) 20 MG Tab Take 1 Tab by mouth every evening. (Patient taking differently: Take 20 mg by mouth two  "times a day may change times on alt/days.) 90 Tab 2   • metoprolol SR (TOPROL XL) 100 MG TABLET SR 24 HR Take 1 Tab by mouth every day. 90 Tab 2   • atorvastatin (LIPITOR) 20 MG Tab Take 1 Tab by mouth every evening. 90 Tab 2     No current facility-administered medications for this visit.        Patient Active Problem List    Diagnosis Date Noted   • Atrial fibrillation (HCC) 06/17/2019   • Hypertension 06/17/2019   • Hypercoagulable state (HCC) 06/17/2019   • Vitamin D deficiency 06/17/2019   • Encounter for cosmetic surgery 01/28/2015       Family History   Problem Relation Age of Onset   • Heart Disease Mother    • Diabetes Mother    • Hypertension Mother    • Hypertension Father    • Kidney Disease Father    • No Known Problems Daughter    • No Known Problems Daughter        She  has a past medical history of Broken heart syndrome (2014), High cholesterol, Hypertension, Pancreatitis (2013), and Psychiatric problem.  She  has a past surgical history that includes pr enlarge breast; tonsillectomy; other orthopedic surgery; gyn surgery (2005); mastopexy (1/28/2015); breast implant removal (1/28/2015); mammoplasty augmentation (1/28/2015); and breast implant revision (Bilateral, 12/14/2016).       Objective:   /72 (BP Location: Left arm, Patient Position: Sitting) Comment: pt reported  Temp 36.1 °C (97 °F) (Oral) Comment: pt reported  Resp 18   Ht 1.626 m (5' 4\") Comment: pt reported  BMI 22.97 kg/m²     Physical Exam:  Constitutional: Alert, no distress, well-groomed.  Skin: No rashes in visible areas.  Eye: Round. Conjunctiva clear, lids normal. No icterus.   ENMT: Lips pink without lesions, good dentition, moist mucous membranes. Phonation normal.  Neck: No masses, no thyromegaly. Moves freely without pain.  Respiratory: Unlabored respiratory effort, no cough or audible wheeze  Psych: Alert and oriented x3, normal affect and mood.       Assessment and Plan:   The following treatment plan was discussed: "     1. Anxiety and depression  Patient is feeling well on current medications. Will continue. Denies any suicidal or homicidal ideation. Emphasized importance of healthy diet and exercise. Discussed that should the patient have any symptoms they should call suicide prevention hotline or report to the emergency room immediately.    - sertraline (ZOLOFT) 100 MG Tab; Take 1 Tab by mouth every day.  Dispense: 90 Tab; Refill: 3    2. Essential hypertension  Well-controlled. Labs as indicated. Continue antihypertensive medications. Discussed decreasing salt intake. Emphasized benefits of exercise and diet. Continue to monitor.    3. Primary insomnia  Chronic but stable problem.  Continue trazodone as prescribed.  Continue work on diet, exercise, sleep hygiene.  Refill has been placed.  - traZODone (DESYREL) 100 MG Tab; TAKE 1 TABLET BY MOUTH ONCE DAILY IN THE EVENING  Dispense: 90 Tab; Refill: 3    4. Vitamin D deficiency  Advised patient take over-the-counter 1-2000 units of vitamin D once daily.  Discussed with patient if cardiology does not repeat vitamin D lab work in the near future I will order the lab work for her.  Patient agreed to plan.  Vitamin D level needs to be reevaluated in 3-6 months.    5. Mixed hyperlipidemia  Well controlled. Labs as indicated. Continue statin medication. Continue to monitor.    6. Osteopenia of multiple sites  Discussed importance of getting adequate vitamin D and calcium.  Advised patient if she is not getting 1200 mg of calcium in her diet to supplement with 500-1000 mg of calcium.  Advised patient take over-the-counter 1-2000 units of vitamin D once daily.  Discussed with patient if cardiology does not repeat vitamin D lab work in the near future I will order the lab work for her.  Patient agreed to plan.  Vitamin D level needs to be reevaluated in 3-6 months.    7. H/O cardiac radiofrequency ablation    Other orders  - hydroCHLOROthiazide (HYDRODIURIL) 12.5 MG tablet; Take 12.5 mg  by mouth as needed.  - Cholecalciferol (VITAMIN D) 50 MCG (2000 UT) Cap; Take 1 Dose by mouth every day.        Follow-up: Return in about 1 year (around 10/26/2021), or if symptoms worsen or fail to improve.

## 2020-10-26 NOTE — LETTER
[facility]  Astrid Ly P.A.-C.  [address]  [place] [address]  Fax: [phone]   Authorization for Release/Disclosure of   Protected Health Information   Name: IRAM ELLIOTT : 1955 SSN: [id number]   Address: [address] [place] [address] [place] [address] Phone:    [phone] (home)    I authorize the entity listed below to release/disclose the PHI below to:   [facility]/Astrid Ly P.A.-C. and Astrid Ly P.A.-C.   Provider or Entity Name:   [facility]      Address   City, [facility], [facility]   Phone:  [phone]      Fax: [phone]     Reason for request: continuity of care   Information to be released:    [ xxx ] LAST COLONOSCOPY,  including any PATH REPORT and follow-up  [  ] LAST FIT/COLOGUARD RESULT [  ] LAST DEXA  [  ] LAST MAMMOGRAM  [  ] LAST PAP  [  ] LAST LABS [  ] RETINA EXAM REPORT  [  ] IMMUNIZATION RECORDS  [  ] Release all info      [  ] Check here and initial the line next to each item to release ALL health information INCLUDING  _____ Care and treatment for drug and / or alcohol abuse  _____ HIV testing, infection status, or AIDS  _____ Genetic Testing    DATES OF SERVICE OR TIME PERIOD TO BE DISCLOSED: _____________  I understand and acknowledge that:  * This Authorization may be revoked at any time by you in writing, except if your health information has already been used or disclosed.  * Your health information that will be used or disclosed as a result of you signing this authorization could be re-disclosed by the recipient. If this occurs, your re-disclosed health information may no longer be protected by State or Federal laws.  * You may refuse to sign this Authorization. Your refusal will not affect your ability to obtain treatment.  * This Authorization becomes effective upon signing and will  on (date) __________.      If no date is indicated, this Authorization will  one (1) year from the signature date.    Name: Iram Salcedo  Janette    Signature:  CONTINUITY OF CARE   Date:     10/26/2020       PLEASE FAX REQUESTED RECORDS BACK TO: [phone]

## 2021-03-03 DIAGNOSIS — Z23 NEED FOR VACCINATION: ICD-10-CM

## 2021-05-07 DIAGNOSIS — F41.9 ANXIETY AND DEPRESSION: ICD-10-CM

## 2021-05-07 DIAGNOSIS — E55.9 VITAMIN D DEFICIENCY: ICD-10-CM

## 2021-05-07 DIAGNOSIS — E78.2 MIXED HYPERLIPIDEMIA: ICD-10-CM

## 2021-05-07 DIAGNOSIS — F51.01 PRIMARY INSOMNIA: ICD-10-CM

## 2021-05-07 DIAGNOSIS — F32.A ANXIETY AND DEPRESSION: ICD-10-CM

## 2021-05-07 DIAGNOSIS — I10 ESSENTIAL HYPERTENSION: ICD-10-CM

## 2021-05-07 RX ORDER — TRAZODONE HYDROCHLORIDE 100 MG/1
TABLET ORAL
Qty: 90 TABLET | Refills: 3 | Status: SHIPPED | OUTPATIENT
Start: 2021-05-07 | End: 2022-06-01 | Stop reason: SDUPTHER

## 2021-05-07 RX ORDER — SERTRALINE HYDROCHLORIDE 100 MG/1
100 TABLET, FILM COATED ORAL DAILY
Qty: 90 TABLET | Refills: 3 | Status: SHIPPED | OUTPATIENT
Start: 2021-05-07 | End: 2021-11-19 | Stop reason: SDUPTHER

## 2021-05-10 ENCOUNTER — PATIENT OUTREACH (OUTPATIENT)
Dept: HEALTH INFORMATION MANAGEMENT | Facility: OTHER | Age: 66
End: 2021-05-10

## 2021-05-10 NOTE — PROGRESS NOTES
1. Attempt #:1    2. HealthConnect Verified: yes    3. Verify PCP: yes    4. Review Care Team: yes    5. WebIZ Checked & Epic Updated: No WebIZ record  · WebIZ Recommendations: SHINGRIX (Shingles)  · Is patient due for Tdap? NO  · Is patient due for Shingles? YES. Patient was not notified of copay/out of pocket cost.    6. Reviewed/Updated the following with patient:       •   Communication Preference Obtained? YES       •   Preferred Pharmacy? YES       •   Preferred Lab? YES       •   Family History (document living status of immediate family members and if + hx of cancer, diabetes, hypertension, hyperlipidemia, heart attack, stroke) YES    7. Annual Wellness Visit Scheduling  · Scheduling Status:Scheduled     8. Care Gap Scheduling (Attempt to Schedule EACH Overdue Care Gap!)     Health Maintenance Due   Topic Date Due   • HEPATITIS C SCREENING  Never done   • COLONOSCOPY  Never done   • IMM ZOSTER VACCINES (2 of 3) 10/03/2012   • IMM PNEUMOCOCCAL VACCINE: 65+ Years (2 of 2 - PPSV23) 01/27/2019   • MAMMOGRAM  10/31/2020        Scheduled patient for Annual Wellness Visit     9. PingStamp Activation: already active    10. PingStamp Radha: no    11. Virtual Visits: no    12. Opt In to Text Messages: no    13. Patient was advised: “This is a free wellness visit. The provider will screen for medical conditions to help you stay healthy. If you have other concerns to address you may be asked to discuss these at a separate visit or there may be an additional fee.”     14. Patient was informed to arrive 15 min prior to their scheduled appointment and bring in their medication bottles.

## 2021-11-19 ENCOUNTER — OFFICE VISIT (OUTPATIENT)
Dept: MEDICAL GROUP | Facility: PHYSICIAN GROUP | Age: 66
End: 2021-11-19
Payer: MEDICARE

## 2021-11-19 VITALS
TEMPERATURE: 98.7 F | WEIGHT: 128 LBS | DIASTOLIC BLOOD PRESSURE: 80 MMHG | HEART RATE: 92 BPM | SYSTOLIC BLOOD PRESSURE: 160 MMHG | RESPIRATION RATE: 16 BRPM | BODY MASS INDEX: 21.85 KG/M2 | HEIGHT: 64 IN | OXYGEN SATURATION: 94 %

## 2021-11-19 DIAGNOSIS — M85.89 OSTEOPENIA OF MULTIPLE SITES: ICD-10-CM

## 2021-11-19 DIAGNOSIS — E55.9 VITAMIN D DEFICIENCY: ICD-10-CM

## 2021-11-19 DIAGNOSIS — Z23 NEED FOR VACCINATION: ICD-10-CM

## 2021-11-19 DIAGNOSIS — F32.A ANXIETY AND DEPRESSION: ICD-10-CM

## 2021-11-19 DIAGNOSIS — I25.10 CORONARY ATHEROSCLEROSIS DUE TO CALCIFIED CORONARY LESION: ICD-10-CM

## 2021-11-19 DIAGNOSIS — H43.819 VITREOUS DEGENERATION, UNSPECIFIED LATERALITY: ICD-10-CM

## 2021-11-19 DIAGNOSIS — Z00.00 MEDICARE ANNUAL WELLNESS VISIT, INITIAL: ICD-10-CM

## 2021-11-19 DIAGNOSIS — E78.2 MIXED HYPERLIPIDEMIA: ICD-10-CM

## 2021-11-19 DIAGNOSIS — F51.01 PRIMARY INSOMNIA: ICD-10-CM

## 2021-11-19 DIAGNOSIS — I48.91 ATRIAL FIBRILLATION, UNSPECIFIED TYPE (HCC): ICD-10-CM

## 2021-11-19 DIAGNOSIS — I51.81 TAKOTSUBO SYNDROME: ICD-10-CM

## 2021-11-19 DIAGNOSIS — Z98.890 H/O CARDIAC RADIOFREQUENCY ABLATION: ICD-10-CM

## 2021-11-19 DIAGNOSIS — F41.9 ANXIETY AND DEPRESSION: ICD-10-CM

## 2021-11-19 DIAGNOSIS — I87.2 VENOUS INSUFFICIENCY: ICD-10-CM

## 2021-11-19 DIAGNOSIS — I25.84 CORONARY ATHEROSCLEROSIS DUE TO CALCIFIED CORONARY LESION: ICD-10-CM

## 2021-11-19 DIAGNOSIS — I10 PRIMARY HYPERTENSION: ICD-10-CM

## 2021-11-19 DIAGNOSIS — Z12.31 ENCOUNTER FOR SCREENING MAMMOGRAM FOR BREAST CANCER: ICD-10-CM

## 2021-11-19 DIAGNOSIS — I78.1 TELANGIECTASIA: ICD-10-CM

## 2021-11-19 PROBLEM — K63.5 HYPERPLASTIC POLYP OF INTESTINE: Status: RESOLVED | Noted: 2018-05-02 | Resolved: 2021-11-19

## 2021-11-19 PROBLEM — D68.59 HYPERCOAGULABLE STATE (HCC): Status: RESOLVED | Noted: 2019-06-17 | Resolved: 2021-11-19

## 2021-11-19 PROBLEM — K63.5 HYPERPLASTIC POLYP OF INTESTINE: Status: ACTIVE | Noted: 2018-05-02

## 2021-11-19 PROCEDURE — G0009 ADMIN PNEUMOCOCCAL VACCINE: HCPCS | Performed by: PHYSICIAN ASSISTANT

## 2021-11-19 PROCEDURE — G0438 PPPS, INITIAL VISIT: HCPCS | Mod: 25 | Performed by: PHYSICIAN ASSISTANT

## 2021-11-19 PROCEDURE — 90732 PPSV23 VACC 2 YRS+ SUBQ/IM: CPT | Performed by: PHYSICIAN ASSISTANT

## 2021-11-19 RX ORDER — SERTRALINE HYDROCHLORIDE 100 MG/1
150 TABLET, FILM COATED ORAL DAILY
Qty: 135 TABLET | Refills: 3 | Status: SHIPPED | OUTPATIENT
Start: 2021-11-19 | End: 2023-06-07

## 2021-11-19 RX ORDER — LISINOPRIL 20 MG/1
20 TABLET ORAL
COMMUNITY
Start: 2021-11-16 | End: 2021-11-19

## 2021-11-19 ASSESSMENT — ACTIVITIES OF DAILY LIVING (ADL): BATHING_REQUIRES_ASSISTANCE: 0

## 2021-11-19 ASSESSMENT — PATIENT HEALTH QUESTIONNAIRE - PHQ9
CLINICAL INTERPRETATION OF PHQ2 SCORE: 6
SUM OF ALL RESPONSES TO PHQ QUESTIONS 1-9: 11
5. POOR APPETITE OR OVEREATING: 0 - NOT AT ALL

## 2021-11-19 ASSESSMENT — ENCOUNTER SYMPTOMS: GENERAL WELL-BEING: GOOD

## 2021-11-19 NOTE — PROGRESS NOTES
Chief Complaint   Patient presents with   • Annual Exam       HPI:  Iram is a 66 y.o. here for Medicare Annual Wellness Visit        Patient Active Problem List    Diagnosis Date Noted   • Primary insomnia 10/26/2020   • Anxiety and depression 10/26/2020   • Mixed hyperlipidemia 10/26/2020   • Osteopenia of multiple sites 10/26/2020   • H/O cardiac radiofrequency ablation 10/26/2020   • Atrial fibrillation (HCC) 06/17/2019   • Hypertension 06/17/2019   • Vitamin D deficiency 06/17/2019   • Takotsubo syndrome 10/14/2014   • Coronary atherosclerosis due to calcified coronary lesion 10/13/2014   • Venous insufficiency 11/01/2012   • Vitreous degeneration 02/23/2012   • Telangiectasia 12/23/2010       Current Outpatient Medications   Medication Sig Dispense Refill   • sertraline (ZOLOFT) 100 MG Tab Take 1.5 Tablets by mouth every day. 135 Tablet 3   • traZODone (DESYREL) 100 MG Tab TAKE 1 TABLET BY MOUTH ONCE DAILY IN THE EVENING 90 tablet 3   • hydroCHLOROthiazide (HYDRODIURIL) 12.5 MG tablet Take 12.5 mg by mouth as needed.     • Cholecalciferol (VITAMIN D) 50 MCG (2000 UT) Cap Take 1 Dose by mouth every day.     • amLODIPine (NORVASC) 5 MG Tab Take 5 mg by mouth every day.     • lisinopril (PRINIVIL) 20 MG Tab Take 1 Tab by mouth every evening. (Patient taking differently: Take 20 mg by mouth two times a day may change times on alt/days.) 90 Tab 2   • metoprolol SR (TOPROL XL) 100 MG TABLET SR 24 HR Take 1 Tab by mouth every day. 90 Tab 2   • atorvastatin (LIPITOR) 20 MG Tab Take 1 Tab by mouth every evening. 90 Tab 2   • zafirlukast (ACCOLATE) 20 MG Tab Take 20 mg by mouth.       No current facility-administered medications for this visit.        Patient is taking medications as noted in medication list.  Current supplements as per medication list.     Allergies: Iodine and Sulfa drugs    Current social contact/activities: Not much, COVID pt states     Is patient current with immunizations? Yes.    She  reports  that she has quit smoking. Her smoking use included cigarettes. She started smoking about 7 years ago. She smoked 0.25 packs per day. She has never used smokeless tobacco. She reports current alcohol use. She reports that she does not use drugs.  Counseling given: Not Answered      DPA/Advanced directive: Patient has Advanced Directive, but it is not on file. Instructed to bring in a copy to scan into their chart.    ROS:    Gait: Uses no assistive device   Ostomy: No   Other tubes: No   Amputations: No   Chronic oxygen use No   Last eye exam 2018  Wears hearing aids: No   : Denies any urinary leakage during the last 6 months      Screening:    Depression Screening  Little interest or pleasure in doing things?  3 - nearly every day  Feeling down, depressed, or hopeless? 3 - nearly every day  Trouble falling or staying asleep, or sleeping too much?  2 - more than half the days  Feeling tired or having little energy?  3 - nearly every day  Poor appetite or overeating?  0 - not at all  Feeling bad about yourself - or that you are a failure or have let yourself or your family down? 0 - not at all  Trouble concentrating on things, such as reading the newspaper or watching television? 0 - not at all  Moving or speaking so slowly that other people could have noticed.  Or the opposite - being so fidgety or restless that you have been moving around a lot more than usual?  0 - not at all  Thoughts that you would be better off dead, or of hurting yourself?  0 - not at all  Patient Health Questionnaire Score: 11    If depressive symptoms identified deferred to follow up visit unless specifically addressed in assessment and plan.    Interpretation of PHQ-9 Total Score   Score Severity   1-4 No Depression   5-9 Mild Depression   10-14 Moderate Depression   15-19 Moderately Severe Depression   20-27 Severe Depression      Screening for Cognitive Impairment  Three Minute Recall (captain, jazmin, picture)  3/3    Draw clock face  with all 12 numbers and set the hands to show 5 past 8.  Yes    If cognitive concerns identified, deferred for follow up unless specifically addressed in assessment and plan.    Fall Risk Assessment  Has the patient had two or more falls in the last year or any fall with injury in the last year?  No  If fall risk identified, deferred for follow up unless specifically addressed in assessment and plan.    Safety Assessment  Throw rugs on floor.  No  Handrails on all stairs.  Yes  Good lighting in all hallways.  Yes  Difficulty hearing.  No  Patient counseled about all safety risks that were identified.    Functional Assessment ADLs  Are there any barriers preventing you from cooking for yourself or meeting nutritional needs?  No.    Are there any barriers preventing you from driving safely or obtaining transportation?  No.    Are there any barriers preventing you from using a telephone or calling for help?  No.    Are there any barriers preventing you from shopping?  No.    Are there any barriers preventing you from taking care of your own finances?  No.    Are there any barriers preventing you from managing your medications?    No.    Are there any barriers preventing you from showering, bathing or dressing yourself?  No.    Are you currently engaging in any exercise or physical activity?  No.     What is your perception of your health?  Good.    Health Maintenance Summary          Overdue - COLORECTAL CANCER SCREENING (COLONOSCOPY - Every 10 Years) Overdue - never done    No completion history exists for this topic.          Overdue - HEPATITIS C SCREENING (Once) Overdue - never done    No completion history exists for this topic.          Overdue - IMM ZOSTER VACCINES (2 of 3) Overdue since 10/3/2012    08/08/2012  Imm Admin: Zoster Vaccine Live (ZVL) (Zostavax) - HISTORICAL DATA          Ordered - MAMMOGRAM (Yearly) Ordered on 11/19/2021    10/31/2019  MA-MAMMO SCREEN BILAT IMPLANTS MYA CAD          Overdue -  IMM INFLUENZA (1) Overdue since 9/1/2021    10/18/2019  Imm Admin: Influenza Vac Subunit Quad Inj (Pf)    02/05/2019  Imm Admin: Influenza (IM) Preservative Free - HISTORICAL DATA    10/25/2017  Imm Admin: Influenza Vac Subunit Quad Inj (Pf)    09/30/2016  Imm Admin: Influenza Vaccine Quad Inj (Pf)    09/30/2016  Imm Admin: Influenza, Unspecified - HISTORICAL DATA    Only the first 5 history entries have been loaded, but more history exists.          Overdue - COVID-19 Vaccine (3 - Booster for Moderna series) Overdue since 10/23/2021    04/23/2021  Imm Admin: Moderna SARS-CoV-2 Vaccine    03/23/2021  Imm Admin: Moderna SARS-CoV-2 Vaccine          BONE DENSITY (Every 5 Years) Tentatively due on 10/31/2024    10/31/2019  DS-BONE DENSITY STUDY (DEXA)          IMM DTaP/Tdap/Td Vaccine (2 - Td or Tdap) Next due on 10/25/2027    10/25/2017  Imm Admin: Tdap Vaccine          IMM PNEUMOCOCCAL VACCINE: 65+ Years (Series Information) Completed    11/19/2021  Imm Admin: Pneumococcal polysaccharide vaccine (PPSV-23)    09/29/2015  Imm Admin: Pneumococcal Conjugate Vaccine (Prevnar/PCV-13)    10/16/2013  Imm Admin: Pneumococcal polysaccharide vaccine (PPSV-23)          IMM HEP B VACCINE (Series Information) Aged Out    No completion history exists for this topic.          IMM MENINGOCOCCAL VACCINE (MCV4) (Series Information) Aged Out    No completion history exists for this topic.                Patient Care Team:  Astrid Ly P.A.-C. as PCP - General (Family Medicine)  Hemant Orosco M.D. (Cardiovascular Disease (Cardiology))    Social History     Tobacco Use   • Smoking status: Former Smoker     Packs/day: 0.25     Types: Cigarettes     Start date: 1/1/2014   • Smokeless tobacco: Never Used   Substance Use Topics   • Alcohol use: Yes     Comment: 3 drinks per week   • Drug use: No     Family History   Problem Relation Age of Onset   • Heart Disease Mother    • Diabetes Mother    • Hypertension Mother    • Hypertension  "Father    • Kidney Disease Father    • No Known Problems Daughter    • No Known Problems Daughter      She  has a past medical history of Broken heart syndrome (2014), High cholesterol, Hypertension, Pancreatitis (2013), and Psychiatric problem.   Past Surgical History:   Procedure Laterality Date   • BREAST IMPLANT REVISION Bilateral 12/14/2016    Procedure: BREAST IMPLANT - EXCHANGE;  Surgeon: Gabbie Orr M.D.;  Location: [facility];  Service:    • MASTOPEXY  1/28/2015    Performed by Gabbie Orr M.D. at [facility]   • BREAST IMPLANT REMOVAL  1/28/2015    Performed by Gabbie Orr M.D. at [facility]   • MAMMOPLASTY AUGMENTATION  1/28/2015    Performed by Gabbie Orr M.D. at [facility]   • GYN SURGERY  2005    HYSTERECTOMY   • OTHER ORTHOPEDIC SURGERY      ORIF RIGHT WRIST   • NE ENLARGE BREAST      X 2   • TONSILLECTOMY      CHILD         Exam:   /80 (BP Location: Left arm, Patient Position: Sitting, BP Cuff Size: Adult)   Pulse 92   Temp 37.1 °C (98.7 °F) (Temporal)   Resp 16   Ht 1.626 m (5' 4\")   Wt 58.1 kg (128 lb)   SpO2 94%  Body mass index is 21.97 kg/m².    Hearing excellent.    Dentition good  Alert, oriented in no acute distress.  Eye contact is good, speech goal directed, affect calm          Assessment and Plan. The following treatment and monitoring plan is recommended:        1. Medicare annual wellness visit, initial  HRA reviewed and appropriate. Reviewed medical history and current medications with patient. Reviewed immunizations with patient.  Ambulatory and Anticipatory Guidelines have been discussed with patient, see discussion below.      2. Atrial fibrillation, unspecified type (HCC)  3. H/O cardiac radiofrequency ablation  Patient has a positive medical history for A. fib.  She underwent cardiac ablation.  Heart is regular rate and rhythm.  She does not need to be on anticoagulation therapy.  She " follows up with her cardiologist at [facility].  She is asymptomatic.  No further work-up.    4. Coronary atherosclerosis due to calcified coronary lesion  Chronic problem.  Stable.  Continue work on diet and exercise.  Continue statin medication.    5. Takotsubo syndrome  Chronic problem.  Appears to be stable.  Continue following up with cardiology.  Continue current medication regimen.  Continue working on diet and exercise.    6. Mixed hyperlipidemia  Well controlled. Labs as indicated. Continue statin medication. Continue to monitor.      7. Telangiectasia  Chronic problem.  Stable.  No further work-up.    8. Anxiety and depression  Anxiety and depression are chronic problems.  Uncontrolled.  During today's appointment Zoloft dosage has been increased from 100 mg once daily to 150 mg tab once daily.  Discussed side effects and adverse reactions of medication with patient.  Advised patient to continue working on diet, exercise, sleep hygiene, hydration, and developing healthy coping mechanisms for stress anxiety.  Suggested referral to psychiatry but patient declined.    Denies any suicidal or homicidal ideation.  Discussed that should the patient have any symptoms they should call suicide prevention hotline or report to the emergency room immediately.    - sertraline (ZOLOFT) 100 MG Tab; Take 1.5 Tablets by mouth every day.  Dispense: 135 Tablet; Refill: 3    9. Primary hypertension  Chronic problem. Controlled. Labs as indicated. Continue antihypertensive medications. Discussed decreasing salt intake. Emphasized benefits of exercise and diet. Continue to monitor.      10. Osteopenia of multiple sites  Advised patient do weightbearing exercise.  Continue vitamin D supplementation.  Discussed importance of getting adequate calcium in diet.    11. Primary insomnia  Chronic problem.  Could improve.  Zoloft dosage was increased during today's appointment.  No medication adjustments were made to trazodone.   Advised patient continue trazodone.  Continue work on diet, exercise, sleep hygiene, hydration, developing healthy coping mechanisms for stress anxiety.    12. Venous insufficiency  Chronic problem.  Stable.  Continue elevating lower legs when at rest.  Decrease sodium consumption.    13. Vitamin D deficiency  Chronic problem.  Unknown is stable.  Lab work is past due.  Continue vitamin D septation.  Continue to monitor.      14. Encounter for screening mammogram for breast cancer  Discussed importance of doing self breast exams least once monthly.  Mammogram has been ordered.  - MA-SCREENING MAMMO BILAT W/TOMOSYNTHESIS W/CAD; Future    15. Need for vaccination  A Pneumovax vaccination was administered to patient without complication. A VIS form was provided to patient.     - Pneumoccocal Polysaccharide Vaccine 23-Valent =>1yo SQ/IM    16. Vitreous degeneration, unspecified laterality       Services suggested: No services needed at this time  Health Care Screening recommendations as per orders if indicated.  Referrals offered: PT/OT/Nutrition counseling/Behavioral Health/Smoking cessation as per orders if indicated.    Discussion today about general wellness and lifestyle habits:    · Prevent falls and reduce trip hazards; Cautioned about securing or removing rugs.  · Have a working fire alarm and carbon monoxide detector;   · Engage in regular physical activity and social activities     Follow-up: Return if symptoms worsen or fail to improve.

## 2021-11-22 ENCOUNTER — HOSPITAL ENCOUNTER (OUTPATIENT)
Dept: LAB | Facility: MEDICAL CENTER | Age: 66
End: 2021-11-22
Attending: PHYSICIAN ASSISTANT
Payer: MEDICARE

## 2021-11-22 DIAGNOSIS — E55.9 VITAMIN D DEFICIENCY: ICD-10-CM

## 2021-11-22 DIAGNOSIS — I10 ESSENTIAL HYPERTENSION: ICD-10-CM

## 2021-11-22 DIAGNOSIS — E78.2 MIXED HYPERLIPIDEMIA: ICD-10-CM

## 2021-11-22 LAB
ALBUMIN SERPL BCP-MCNC: 4.5 G/DL (ref 3.2–4.9)
ALBUMIN/GLOB SERPL: 2 G/DL
ALP SERPL-CCNC: 76 U/L (ref 30–99)
ALT SERPL-CCNC: 19 U/L (ref 2–50)
ANION GAP SERPL CALC-SCNC: 12 MMOL/L (ref 7–16)
AST SERPL-CCNC: 24 U/L (ref 12–45)
BASOPHILS # BLD AUTO: 0.7 % (ref 0–1.8)
BASOPHILS # BLD: 0.05 K/UL (ref 0–0.12)
BILIRUB SERPL-MCNC: 0.4 MG/DL (ref 0.1–1.5)
BUN SERPL-MCNC: 21 MG/DL (ref 8–22)
CALCIUM SERPL-MCNC: 9.6 MG/DL (ref 8.5–10.5)
CHLORIDE SERPL-SCNC: 107 MMOL/L (ref 96–112)
CHOLEST SERPL-MCNC: 155 MG/DL (ref 100–199)
CO2 SERPL-SCNC: 25 MMOL/L (ref 20–33)
CREAT SERPL-MCNC: 0.67 MG/DL (ref 0.5–1.4)
EOSINOPHIL # BLD AUTO: 0.56 K/UL (ref 0–0.51)
EOSINOPHIL NFR BLD: 7.7 % (ref 0–6.9)
ERYTHROCYTE [DISTWIDTH] IN BLOOD BY AUTOMATED COUNT: 51.2 FL (ref 35.9–50)
FASTING STATUS PATIENT QL REPORTED: NORMAL
GLOBULIN SER CALC-MCNC: 2.2 G/DL (ref 1.9–3.5)
GLUCOSE SERPL-MCNC: 86 MG/DL (ref 65–99)
HCT VFR BLD AUTO: 42 % (ref 37–47)
HDLC SERPL-MCNC: 56 MG/DL
HGB BLD-MCNC: 13.8 G/DL (ref 12–16)
IMM GRANULOCYTES # BLD AUTO: 0.01 K/UL (ref 0–0.11)
IMM GRANULOCYTES NFR BLD AUTO: 0.1 % (ref 0–0.9)
LDLC SERPL CALC-MCNC: 88 MG/DL
LYMPHOCYTES # BLD AUTO: 2.67 K/UL (ref 1–4.8)
LYMPHOCYTES NFR BLD: 36.6 % (ref 22–41)
MCH RBC QN AUTO: 34.4 PG (ref 27–33)
MCHC RBC AUTO-ENTMCNC: 32.9 G/DL (ref 33.6–35)
MCV RBC AUTO: 104.7 FL (ref 81.4–97.8)
MONOCYTES # BLD AUTO: 0.45 K/UL (ref 0–0.85)
MONOCYTES NFR BLD AUTO: 6.2 % (ref 0–13.4)
NEUTROPHILS # BLD AUTO: 3.56 K/UL (ref 2–7.15)
NEUTROPHILS NFR BLD: 48.7 % (ref 44–72)
NRBC # BLD AUTO: 0 K/UL
NRBC BLD-RTO: 0 /100 WBC
PLATELET # BLD AUTO: 235 K/UL (ref 164–446)
PMV BLD AUTO: 11.3 FL (ref 9–12.9)
POTASSIUM SERPL-SCNC: 4.1 MMOL/L (ref 3.6–5.5)
PROT SERPL-MCNC: 6.7 G/DL (ref 6–8.2)
RBC # BLD AUTO: 4.01 M/UL (ref 4.2–5.4)
SODIUM SERPL-SCNC: 144 MMOL/L (ref 135–145)
TRIGL SERPL-MCNC: 56 MG/DL (ref 0–149)
WBC # BLD AUTO: 7.3 K/UL (ref 4.8–10.8)

## 2021-11-22 PROCEDURE — 82306 VITAMIN D 25 HYDROXY: CPT

## 2021-11-22 PROCEDURE — 80053 COMPREHEN METABOLIC PANEL: CPT

## 2021-11-22 PROCEDURE — 36415 COLL VENOUS BLD VENIPUNCTURE: CPT

## 2021-11-22 PROCEDURE — 85025 COMPLETE CBC W/AUTO DIFF WBC: CPT

## 2021-11-22 PROCEDURE — 80061 LIPID PANEL: CPT

## 2021-11-24 LAB — 25(OH)D3 SERPL-MCNC: 42 NG/ML (ref 30–80)

## 2022-04-18 ENCOUNTER — TELEPHONE (OUTPATIENT)
Dept: SCHEDULING | Facility: IMAGING CENTER | Age: 67
End: 2022-04-18

## 2022-06-01 ENCOUNTER — OFFICE VISIT (OUTPATIENT)
Dept: MEDICAL GROUP | Facility: PHYSICIAN GROUP | Age: 67
End: 2022-06-01
Payer: MEDICARE

## 2022-06-01 VITALS
BODY MASS INDEX: 21.48 KG/M2 | SYSTOLIC BLOOD PRESSURE: 130 MMHG | OXYGEN SATURATION: 93 % | WEIGHT: 125.8 LBS | HEIGHT: 64 IN | HEART RATE: 77 BPM | DIASTOLIC BLOOD PRESSURE: 68 MMHG | TEMPERATURE: 98.4 F | RESPIRATION RATE: 16 BRPM

## 2022-06-01 DIAGNOSIS — I10 PRIMARY HYPERTENSION: ICD-10-CM

## 2022-06-01 DIAGNOSIS — M54.2 CHRONIC NECK PAIN: ICD-10-CM

## 2022-06-01 DIAGNOSIS — F51.01 PRIMARY INSOMNIA: ICD-10-CM

## 2022-06-01 DIAGNOSIS — R20.0 NUMBNESS OF TOES: ICD-10-CM

## 2022-06-01 DIAGNOSIS — G89.29 CHRONIC NECK PAIN: ICD-10-CM

## 2022-06-01 PROCEDURE — 99214 OFFICE O/P EST MOD 30 MIN: CPT

## 2022-06-01 RX ORDER — AMLODIPINE BESYLATE 5 MG/1
1 TABLET ORAL DAILY
COMMUNITY
Start: 2022-04-06

## 2022-06-01 RX ORDER — HYDROCHLOROTHIAZIDE 12.5 MG/1
12.5 TABLET ORAL DAILY
COMMUNITY
Start: 2022-04-29 | End: 2022-07-28

## 2022-06-01 RX ORDER — AMOXICILLIN AND CLAVULANATE POTASSIUM 875; 125 MG/1; MG/1
1 TABLET, FILM COATED ORAL 2 TIMES DAILY
COMMUNITY
Start: 2022-05-25 | End: 2022-06-01

## 2022-06-01 RX ORDER — LISINOPRIL 20 MG/1
1 TABLET ORAL 2 TIMES DAILY
COMMUNITY
Start: 2022-04-06

## 2022-06-01 RX ORDER — TRAZODONE HYDROCHLORIDE 100 MG/1
TABLET ORAL
Qty: 90 TABLET | Refills: 3 | Status: SHIPPED | OUTPATIENT
Start: 2022-06-01 | End: 2023-10-03 | Stop reason: SDUPTHER

## 2022-06-01 RX ORDER — CYCLOBENZAPRINE HCL 10 MG
TABLET ORAL
COMMUNITY
Start: 2022-04-29

## 2022-06-01 RX ORDER — HYDROCODONE BITARTRATE AND ACETAMINOPHEN 5; 325 MG/1; MG/1
TABLET ORAL
COMMUNITY
Start: 2022-04-30 | End: 2023-06-07

## 2022-06-01 RX ORDER — ATORVASTATIN CALCIUM 20 MG/1
20 TABLET, FILM COATED ORAL DAILY
COMMUNITY
Start: 2022-04-29 | End: 2022-06-01

## 2022-06-01 ASSESSMENT — FIBROSIS 4 INDEX: FIB4 SCORE: 1.59

## 2022-06-01 ASSESSMENT — ENCOUNTER SYMPTOMS
NECK PAIN: 1
WEAKNESS: 0
BACK PAIN: 1
TINGLING: 1
SENSORY CHANGE: 0

## 2022-06-01 ASSESSMENT — PATIENT HEALTH QUESTIONNAIRE - PHQ9: CLINICAL INTERPRETATION OF PHQ2 SCORE: 0

## 2022-06-01 NOTE — ASSESSMENT & PLAN NOTE
Chronic condition currently active  - Lumbar x-ray ordered  -Referral to physical therapy  -Continue to take ibuprofen and Flexeril 10 mg as prescribed for back pain relief.  -Recommend referral to pain management however patient declines at this time

## 2022-06-01 NOTE — ASSESSMENT & PLAN NOTE
This is a new condition of uncertain prognosis  - Recommend referral to physical therapy  - Recommend pain management should this condition still require narcotic treatment  - RICE modalities encouraged along with continuation of nonsteroidal anti-inflammatories and Tylenol, heat pads, massage therapy, and chiropractic as needed for symptom relief
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
## 2022-06-01 NOTE — ASSESSMENT & PLAN NOTE
Chronic stable condition in which blood pressure in clinic today is 130/68  - Continue to take medication as prescribed.  Hydrochlorothiazide 12.5 mg daily, lisinopril 20 mg 2 times daily, metoprolol 100 mg daily  -Continue to follow with cardiology as directed

## 2022-06-02 ENCOUNTER — APPOINTMENT (OUTPATIENT)
Dept: RADIOLOGY | Facility: IMAGING CENTER | Age: 67
End: 2022-06-02
Payer: MEDICARE

## 2022-06-02 DIAGNOSIS — M54.2 CHRONIC NECK PAIN: ICD-10-CM

## 2022-06-02 DIAGNOSIS — G89.29 CHRONIC NECK PAIN: ICD-10-CM

## 2022-06-02 DIAGNOSIS — R20.0 NUMBNESS OF TOES: ICD-10-CM

## 2022-06-02 PROCEDURE — 72100 X-RAY EXAM L-S SPINE 2/3 VWS: CPT | Mod: TC

## 2022-11-11 ENCOUNTER — PATIENT MESSAGE (OUTPATIENT)
Dept: HEALTH INFORMATION MANAGEMENT | Facility: OTHER | Age: 67
End: 2022-11-11

## 2023-06-06 ENCOUNTER — OFFICE VISIT (OUTPATIENT)
Dept: DERMATOLOGY | Facility: IMAGING CENTER | Age: 68
End: 2023-06-06
Payer: MEDICARE

## 2023-06-06 DIAGNOSIS — L82.1 SK (SEBORRHEIC KERATOSIS): ICD-10-CM

## 2023-06-06 DIAGNOSIS — L81.4 LENTIGINES: ICD-10-CM

## 2023-06-06 PROCEDURE — 99212 OFFICE O/P EST SF 10 MIN: CPT | Performed by: NURSE PRACTITIONER

## 2023-06-06 NOTE — PROGRESS NOTES
DERMATOLOGY NOTE  NEW VISIT       Chief complaint: Skin Lesion     HPI:  skin lesion   Location:  right shoulder   Time present: 5 +  Painful lesion: No  Itching lesion: No  Enlarging lesion: No  Anything make it better or worse?no     HPI:  skin lesion   Location:  right  forehead   Time present:  4 years   Painful lesion: No  Itching lesion: No  Enlarging lesion: No  Anything make it better or worse?no     HPI:  skin lesion   Location:  neck   Time present: 1 year   Painful lesion: No  Itching lesion: No  Enlarging lesion: No  Anything make it better or worse?no     HPI:  scaly skin lesion   Location:  scalp   Time present:  1 year   Painful lesion: No  Itching lesion: No  Enlarging lesion: No  Anything make it better or worse?no     History of skin cancer: No  History of precancers/actinic keratoses: Yes, Details: face   History of biopsies:Yes, Details: back , results benign   History of blistering/severe sunburns:Yes, Details: young adult   Family history of skin cancer:No  Family history of atypical moles:No      Allergies   Allergen Reactions    Iodine Anaphylaxis     Topical/digested has allergic reaction     Sulfa Drugs Hives and Nausea        MEDICATIONS:  Medications relevant to specialty reviewed.     REVIEW OF SYSTEMS:   Positive for skin (see HPI)  Negative for fevers and chills       EXAM:  There were no vitals taken for this visit.  Constitutional: Well-developed, well-nourished, and in no distress.     A focused skin exam was performed including the affected areas of the scalp, face, neck and R shoulder. Notable findings on exam today listed below and/or in assessment/plan.     Few tan and medium brown stuck-on waxy papules scattered on the trunk, R shoulder and scalp, specifically to lesions of concern per HPI  -sun exposed skin of trunk and b/l upper extremities and face with scattered clinically benign light brown reticulated macules all of which were morphologically similar and none of which  were suspicious for skin cancer today on exam      IMPRESSION / PLAN:    1. Lentigines  - Benign-appearing nature of lesions discussed during exam.   - stressed importance of sun protection, handout given  - Advised to continue to monitor for any return to clinic for new or concerning changes.      2. SK (seborrheic keratosis)  - Benign-appearing nature of lesions discussed during exam.   - Advised to continue to monitor for any return to clinic for new or concerning changes.            Please note that this dictation was created using voice recognition software. I have made every reasonable attempt to correct obvious errors, but I expect that there are errors of grammar and possibly content that I did not discover before finalizing the note.      Return to clinic in: Return for PRN. and as needed for any new or changing skin lesions.

## 2023-06-07 ENCOUNTER — HOSPITAL ENCOUNTER (OUTPATIENT)
Dept: LAB | Facility: MEDICAL CENTER | Age: 68
End: 2023-06-07
Attending: INTERNAL MEDICINE
Payer: MEDICARE

## 2023-06-07 ENCOUNTER — HOSPITAL ENCOUNTER (OUTPATIENT)
Dept: LAB | Facility: MEDICAL CENTER | Age: 68
End: 2023-06-07
Payer: MEDICARE

## 2023-06-07 ENCOUNTER — OFFICE VISIT (OUTPATIENT)
Dept: MEDICAL GROUP | Facility: PHYSICIAN GROUP | Age: 68
End: 2023-06-07
Payer: MEDICARE

## 2023-06-07 VITALS
BODY MASS INDEX: 20.86 KG/M2 | HEART RATE: 80 BPM | DIASTOLIC BLOOD PRESSURE: 60 MMHG | SYSTOLIC BLOOD PRESSURE: 160 MMHG | WEIGHT: 122.2 LBS | OXYGEN SATURATION: 93 % | HEIGHT: 64 IN | TEMPERATURE: 98.2 F

## 2023-06-07 DIAGNOSIS — Z00.00 ENCOUNTER FOR MEDICARE ANNUAL WELLNESS EXAM: ICD-10-CM

## 2023-06-07 DIAGNOSIS — I48.91 ATRIAL FIBRILLATION, UNSPECIFIED TYPE (HCC): ICD-10-CM

## 2023-06-07 DIAGNOSIS — F51.01 PRIMARY INSOMNIA: ICD-10-CM

## 2023-06-07 DIAGNOSIS — Z11.59 NEED FOR HEPATITIS C SCREENING TEST: ICD-10-CM

## 2023-06-07 DIAGNOSIS — M54.50 CHRONIC BILATERAL LOW BACK PAIN WITHOUT SCIATICA: ICD-10-CM

## 2023-06-07 DIAGNOSIS — I51.81 TAKOTSUBO SYNDROME: ICD-10-CM

## 2023-06-07 DIAGNOSIS — E78.2 MIXED HYPERLIPIDEMIA: ICD-10-CM

## 2023-06-07 DIAGNOSIS — G89.29 CHRONIC BILATERAL LOW BACK PAIN WITHOUT SCIATICA: ICD-10-CM

## 2023-06-07 DIAGNOSIS — F41.9 ANXIETY AND DEPRESSION: ICD-10-CM

## 2023-06-07 DIAGNOSIS — I10 PRIMARY HYPERTENSION: ICD-10-CM

## 2023-06-07 DIAGNOSIS — F32.A ANXIETY AND DEPRESSION: ICD-10-CM

## 2023-06-07 LAB
ALBUMIN SERPL BCP-MCNC: 4.5 G/DL (ref 3.2–4.9)
ALBUMIN/GLOB SERPL: 1.8 G/DL
ALP SERPL-CCNC: 81 U/L (ref 30–99)
ALT SERPL-CCNC: 19 U/L (ref 2–50)
ANION GAP SERPL CALC-SCNC: 13 MMOL/L (ref 7–16)
AST SERPL-CCNC: 26 U/L (ref 12–45)
BILIRUB SERPL-MCNC: 0.8 MG/DL (ref 0.1–1.5)
BUN SERPL-MCNC: 17 MG/DL (ref 8–22)
CALCIUM ALBUM COR SERPL-MCNC: 9.5 MG/DL (ref 8.5–10.5)
CALCIUM SERPL-MCNC: 9.9 MG/DL (ref 8.5–10.5)
CHLORIDE SERPL-SCNC: 101 MMOL/L (ref 96–112)
CHOLEST SERPL-MCNC: 175 MG/DL (ref 100–199)
CO2 SERPL-SCNC: 24 MMOL/L (ref 20–33)
CREAT SERPL-MCNC: 0.64 MG/DL (ref 0.5–1.4)
ERYTHROCYTE [DISTWIDTH] IN BLOOD BY AUTOMATED COUNT: 48.4 FL (ref 35.9–50)
FASTING STATUS PATIENT QL REPORTED: NORMAL
GFR SERPLBLD CREATININE-BSD FMLA CKD-EPI: 95 ML/MIN/1.73 M 2
GLOBULIN SER CALC-MCNC: 2.5 G/DL (ref 1.9–3.5)
GLUCOSE SERPL-MCNC: 91 MG/DL (ref 65–99)
HCT VFR BLD AUTO: 46.4 % (ref 37–47)
HDLC SERPL-MCNC: 69 MG/DL
HGB BLD-MCNC: 15.4 G/DL (ref 12–16)
LDLC SERPL CALC-MCNC: 94 MG/DL
MCH RBC QN AUTO: 34.1 PG (ref 27–33)
MCHC RBC AUTO-ENTMCNC: 33.2 G/DL (ref 32.2–35.5)
MCV RBC AUTO: 102.7 FL (ref 81.4–97.8)
PLATELET # BLD AUTO: 294 K/UL (ref 164–446)
PMV BLD AUTO: 10.4 FL (ref 9–12.9)
POTASSIUM SERPL-SCNC: 4 MMOL/L (ref 3.6–5.5)
PROT SERPL-MCNC: 7 G/DL (ref 6–8.2)
RBC # BLD AUTO: 4.52 M/UL (ref 4.2–5.4)
SODIUM SERPL-SCNC: 138 MMOL/L (ref 135–145)
TRIGL SERPL-MCNC: 59 MG/DL (ref 0–149)
WBC # BLD AUTO: 9.1 K/UL (ref 4.8–10.8)

## 2023-06-07 PROCEDURE — G0439 PPPS, SUBSEQ VISIT: HCPCS

## 2023-06-07 PROCEDURE — 85027 COMPLETE CBC AUTOMATED: CPT

## 2023-06-07 PROCEDURE — 3077F SYST BP >= 140 MM HG: CPT

## 2023-06-07 PROCEDURE — 3078F DIAST BP <80 MM HG: CPT

## 2023-06-07 PROCEDURE — 80061 LIPID PANEL: CPT

## 2023-06-07 PROCEDURE — 36415 COLL VENOUS BLD VENIPUNCTURE: CPT

## 2023-06-07 PROCEDURE — 80053 COMPREHEN METABOLIC PANEL: CPT

## 2023-06-07 RX ORDER — MELOXICAM 15 MG/1
15 TABLET ORAL DAILY
COMMUNITY

## 2023-06-07 ASSESSMENT — ENCOUNTER SYMPTOMS: GENERAL WELL-BEING: EXCELLENT

## 2023-06-07 ASSESSMENT — FIBROSIS 4 INDEX: FIB4 SCORE: 1.62

## 2023-06-07 ASSESSMENT — PATIENT HEALTH QUESTIONNAIRE - PHQ9: CLINICAL INTERPRETATION OF PHQ2 SCORE: 0

## 2023-06-07 ASSESSMENT — ACTIVITIES OF DAILY LIVING (ADL): BATHING_REQUIRES_ASSISTANCE: 0

## 2023-06-07 NOTE — ASSESSMENT & PLAN NOTE
Chronic-160/60 reports to have [place] HTN. Home JOSE ANTONIO 120-124/80s  -Taking amlodipine 5 mg daily, lisinopril 40 mg daily, metoprolol 100 mg daily- no side effects

## 2023-06-07 NOTE — ASSESSMENT & PLAN NOTE
Lab Results   Component Value Date/Time    CHOLSTRLTOT 155 11/22/2021 09:58 AM    CHOLSTRLTOT 139 04/10/2019 06:46 AM    LDL 88 11/22/2021 09:58 AM    LDL 63 04/10/2019 06:46 AM    HDL 56 11/22/2021 09:58 AM    HDL 43 04/10/2019 06:46 AM    TRIGLYCERIDE 56 11/22/2021 09:58 AM    TRIGLYCERIDE 164 (H) 04/10/2019 06:46 AM

## 2023-06-07 NOTE — PROGRESS NOTES
Chief Complaint   Patient presents with    Annual Exam     AWV       HPI:  Iram is a 68 y.o. here for Medicare Annual Wellness Visit        Patient Active Problem List    Diagnosis Date Noted    Chronic bilateral low back pain without sciatica 06/07/2023    Numbness of toes 06/01/2022    Chronic neck pain 06/01/2022    Primary insomnia 10/26/2020    Anxiety and depression 10/26/2020    Mixed hyperlipidemia 10/26/2020    Osteopenia of multiple sites 10/26/2020    H/O cardiac radiofrequency ablation 10/26/2020    Atrial fibrillation (HCC) 06/17/2019    Hypertension 06/17/2019    Vitamin D deficiency 06/17/2019    Takotsubo syndrome 10/14/2014    Coronary atherosclerosis due to calcified coronary lesion 10/13/2014    Venous insufficiency 11/01/2012    Vitreous degeneration 02/23/2012    Telangiectasia 12/23/2010       Current Outpatient Medications   Medication Sig Dispense Refill    meloxicam (MOBIC) 15 MG tablet Take 15 mg by mouth every day.      amLODIPine (NORVASC) 5 MG Tab Take 1 Tablet by mouth every day.      cyclobenzaprine (FLEXERIL) 10 mg Tab TAKE 1 TABLET BY MOUTH THREE TIMES DAILY AS NEEDED FOR MUSCLE SPASMS FOR UP TO 10 DAYS.      lisinopril (PRINIVIL) 20 MG Tab Take 1 Tablet by mouth 2 times a day.      traZODone (DESYREL) 100 MG Tab TAKE 1 TABLET BY MOUTH ONCE DAILY IN THE EVENING 90 Tablet 3    Cholecalciferol (VITAMIN D) 50 MCG (2000 UT) Cap Take 1 Dose by mouth every day.      metoprolol SR (TOPROL XL) 100 MG TABLET SR 24 HR Take 1 Tab by mouth every day. 90 Tab 2    atorvastatin (LIPITOR) 20 MG Tab Take 1 Tab by mouth every evening. 90 Tab 2     No current facility-administered medications for this visit.          Current supplements as per medication list.     Allergies: Iodine and Sulfa drugs    Current social contact/activities: \ travel, visiting with family and friends, concerts    Is patient current with immunizations? Yes.    She  reports that she quit smoking about 5 years ago. Her smoking  use included cigarettes. She started smoking about 9 years ago. She has a 1.00 pack-year smoking history. She has never used smokeless tobacco. She reports current alcohol use. She reports that she does not use drugs.  Counseling given: Not Answered      ROS:    Gait: Uses no assistive device   Ostomy: No   Other tubes: No   Amputations: No   Chronic oxygen use No   Last eye exam 2018   Wears hearing aids: No   : Denies any urinary leakage during the last 6 months    Screening:    Depression Screening  Little interest or pleasure in doing things?  0 - not at all  Feeling down, depressed, or hopeless? 0 - not at all  Patient Health Questionnaire Score: 0    If depressive symptoms identified deferred to follow up visit unless specifically addressed in assessment and plan.    Interpretation of PHQ-9 Total Score   Score Severity   1-4 No Depression   5-9 Mild Depression   10-14 Moderate Depression   15-19 Moderately Severe Depression   20-27 Severe Depression    Screening for Cognitive Impairment  Three Minute Recall (daughter, heaven, mountain)  3/3    Kirill clock face with all 12 numbers and set the hands to show 10 past 11.  Yes    If cognitive concerns identified, deferred for follow up unless specifically addressed in assessment and plan.    Fall Risk Assessment  Has the patient had two or more falls in the last year or any fall with injury in the last year?  No  If fall risk identified, deferred for follow up unless specifically addressed in assessment and plan.    Safety Assessment  Throw rugs on floor.  Yes  Handrails on all stairs.  Yes  Good lighting in all hallways.  Yes  Difficulty hearing.  No  Patient counseled about all safety risks that were identified.    Functional Assessment ADLs  Are there any barriers preventing you from cooking for yourself or meeting nutritional needs?  No.    Are there any barriers preventing you from driving safely or obtaining transportation?  No.    Are there any barriers  preventing you from using a telephone or calling for help?  No.    Are there any barriers preventing you from shopping?  No.    Are there any barriers preventing you from taking care of your own finances?  No.    Are there any barriers preventing you from managing your medications?  No.    Are there any barriers preventing you from showering, bathing or dressing yourself?  No.    Are you currently engaging in any exercise or physical activity?  Yes.     What is your perception of your health?  Excellent.    Advance Care Planning  Do you have an Advance Directive, Living Will, Durable Power of , or POLST? No               Health Maintenance Summary            Ordered - HEPATITIS C SCREENING (Once) Ordered on 6/7/2023      No completion history exists for this topic.              Overdue - COVID-19 Vaccine (5 - Moderna series) Overdue since 6/18/2021 04/23/2021  Imm Admin: MODERNA SARS-COV-2 VACCINE (12+)    04/23/2021  Imm Admin: MODERNA SARS-COV-2 VACCINE (12+)    03/23/2021  Imm Admin: MODERNA SARS-COV-2 VACCINE (12+)    03/23/2021  Imm Admin: MODERNA SARS-COV-2 VACCINE (12+)              Overdue - MAMMOGRAM (Every 2 Years) Overdue since 10/31/2021      10/31/2019  MA-MAMMO SCREEN BILAT IMPLANTS MYA CAD    10/20/2016  [id number]    12/05/2014  [id number]              IMM INFLUENZA (Season Ended) Next due on 9/1/2023 12/04/2020  Imm Admin: Influenza Vaccine Adult HD    10/18/2019  Imm Admin: Influenza Vac Subunit Quad Inj (Pf)    10/18/2019  Imm Admin: Influenza Vac Subunit Quad Inj (Pf)    02/05/2019  Imm Admin: Influenza (IM) Preservative Free - HISTORICAL DATA    10/25/2017  Imm Admin: Influenza Vac Subunit Quad Inj (Pf)    Only the first 5 history entries have been loaded, but more history exists.              Annual Wellness Visit (Every 366 Days) Next due on 6/7/2024 06/07/2023  Visit Dx: Encounter for Medicare annual wellness exam    06/07/2023  Level of  Service: ANNUAL WELLNESS VISIT-INCLUDES PPPS SUBSEQUE*    11/19/2021  Visit Dx: Medicare annual wellness visit, initial              BONE DENSITY (Every 5 Years) Next due on 10/31/2024      10/31/2019  DS-BONE DENSITY STUDY (DEXA)              IMM DTaP/Tdap/Td Vaccine (2 - Td or Tdap) Next due on 10/25/2027      10/25/2017  Imm Admin: Tdap Vaccine              COLORECTAL CANCER SCREENING (COLONOSCOPY - Every 10 Years) Next due on 1/29/2028 01/29/2018  COLONOSCOPY (Reason not specified - Normal)              IMM PNEUMOCOCCAL VACCINE: 65+ Years (Series Information) Completed      11/19/2021  Imm Admin: Pneumococcal polysaccharide vaccine (PPSV-23)    11/19/2021  Imm Admin: Pneumococcal polysaccharide vaccine (PPSV-23)    09/29/2015  Imm Admin: Pneumococcal Conjugate Vaccine (Prevnar/PCV-13)    10/16/2013  Imm Admin: Pneumococcal polysaccharide vaccine (PPSV-23)              IMM ZOSTER VACCINES (Series Information) Completed      12/30/2022  Imm Admin: Zoster Vaccine Recombinant (RZV) (SHINGRIX)    10/03/2022  Imm Admin: Zoster Vaccine Recombinant (RZV) (SHINGRIX)    08/08/2012  Imm Admin: Zoster Vaccine Live (ZVL) (Zostavax) - HISTORICAL DATA    08/08/2012  Imm Admin: Zoster Vaccine Live (ZVL) (Zostavax) - HISTORICAL DATA              IMM HEP B VACCINE (Series Information) Aged Out      No completion history exists for this topic.              HPV Vaccines (Series Information) Aged Out      No completion history exists for this topic.              IMM MENINGOCOCCAL ACWY VACCINE (Series Information) Aged Out      No completion history exists for this topic.                    Patient Care Team:  Karsten Hawthorne D.N.P. as PCP - General (Nurse Practitioner Family)  Hemant Orosco M.D. (Cardiovascular Disease (Cardiology))    Social History     Tobacco Use    Smoking status: Former     Packs/day: 0.25     Years: 4.00     Pack years: 1.00     Types: Cigarettes     Start date: 1/1/2014     Quit date: 6/1/2018      "Years since quittin.0    Smokeless tobacco: Never   Vaping Use    Vaping Use: Never used   Substance Use Topics    Alcohol use: Yes     Comment: 3 drinks per week    Drug use: No     Family History   Problem Relation Age of Onset    Heart Disease Mother     Diabetes Mother     Hypertension Mother     Hypertension Father     Kidney Disease Father     Psychiatric Illness Sister     No Known Problems Brother     No Known Problems Daughter     No Known Problems Daughter      She  has a past medical history of Broken heart syndrome (), High cholesterol, Hypertension, Pancreatitis (), and Psychiatric problem.   Past Surgical History:   Procedure Laterality Date    BREAST IMPLANT REVISION Bilateral 2016    Procedure: BREAST IMPLANT - EXCHANGE;  Surgeon: Gabbie Orr M.D.;  Location: [facility];  Service:     MASTOPEXY  2015    Performed by Gabbie Orr M.D. at [facility]    BREAST IMPLANT REMOVAL  2015    Performed by Gabbie Orr M.D. at [facility]    MAMMOPLASTY AUGMENTATION  2015    Performed by Gabbie Orr M.D. at [facility]    GYN SURGERY  2005    HYSTERECTOMY    APPENDECTOMY      OTHER ORTHOPEDIC SURGERY      ORIF RIGHT WRIST    CO ENLARGE BREAST      X 2    TONSILLECTOMY      CHILD       Exam:   BP (!) 160/60 (BP Location: Left arm, Patient Position: Sitting, BP Cuff Size: Adult)   Pulse 80   Temp 36.8 °C (98.2 °F) (Temporal)   Ht 1.626 m (5' 4\")   Wt 55.4 kg (122 lb 3.2 oz)   SpO2 93%  Body mass index is 20.98 kg/m².    Hearing excellent.    Dentition good  Alert, oriented in no acute distress.  Eye contact is good, speech goal directed, affect calm      Assessment and Plan. The following treatment and monitoring plan is recommended:    Problem List Items Addressed This Visit       Atrial fibrillation (HCC)     -Chronic, in remission  -Managed by cardiology-[facility]  -Ablation done by " cardiology apx 2019  -EKG done yesterday shows NSR           Hypertension     Chronic-160/60 reports to have [place] HTN. Home JOSE ANTONIO 120-124/80s  -Taking amlodipine 5 mg daily, lisinopril 40 mg daily, metoprolol 100 mg daily- no side effects         Primary insomnia     Chronic condition stable  - Trazodone 100 mg nightly if needed         Anxiety and depression     Chronic, stable  Weaned off sertraline, denies SI         Mixed hyperlipidemia     Lab Results   Component Value Date/Time    CHOLSTRLTOT 155 2021 09:58 AM    CHOLSTRLTOT 139 04/10/2019 06:46 AM    LDL 88 2021 09:58 AM    LDL 63 04/10/2019 06:46 AM    HDL 56 2021 09:58 AM    HDL 43 04/10/2019 06:46 AM    TRIGLYCERIDE 56 2021 09:58 AM    TRIGLYCERIDE 164 (H) 04/10/2019 06:46 AM              Takotsubo syndrome     Chronic, stable  -Managed by cardiology  -No current symptoms         Chronic bilateral low back pain without sciatica    Relevant Medications    meloxicam (MOBIC) 15 MG tablet     Other Visit Diagnoses       Need for hepatitis C screening test        Relevant Orders    HCV Scrn ( [id number] [employer] - Medicare Patients Only)    Encounter for Medicare annual wellness exam                   Services suggested: No services needed at this time  Health Care Screening recommendations as per orders if indicated.  Referrals offered: PT/OT/Nutrition counseling/Behavioral Health/Smoking cessation as per orders if indicated.    Discussion today about general wellness and lifestyle habits:    Prevent falls and reduce trip hazards; Cautioned about securing or removing rugs.  Have a working fire alarm and carbon monoxide detector;   Engage in regular physical activity and social activities     Follow-up: Return in about 6 months (around 2023).

## 2023-06-07 NOTE — ASSESSMENT & PLAN NOTE
-Chronic, in remission  -Managed by cardiology-[facility]  -Ablation done by cardiology apx 2019  -EKG done yesterday shows NSR

## 2023-10-03 DIAGNOSIS — F51.01 PRIMARY INSOMNIA: ICD-10-CM

## 2023-10-03 RX ORDER — TRAZODONE HYDROCHLORIDE 100 MG/1
TABLET ORAL
Qty: 90 TABLET | Refills: 3 | Status: SHIPPED | OUTPATIENT
Start: 2023-10-03

## 2024-06-06 ENCOUNTER — HOSPITAL ENCOUNTER (OUTPATIENT)
Dept: LAB | Facility: MEDICAL CENTER | Age: 69
End: 2024-06-06
Attending: INTERNAL MEDICINE
Payer: MEDICARE

## 2024-06-06 ENCOUNTER — TELEPHONE (OUTPATIENT)
Dept: MEDICAL GROUP | Facility: PHYSICIAN GROUP | Age: 69
End: 2024-06-06

## 2024-06-06 ENCOUNTER — OFFICE VISIT (OUTPATIENT)
Dept: MEDICAL GROUP | Facility: PHYSICIAN GROUP | Age: 69
End: 2024-06-06
Payer: MEDICARE

## 2024-06-06 VITALS
WEIGHT: 119.2 LBS | HEIGHT: 64 IN | SYSTOLIC BLOOD PRESSURE: 140 MMHG | HEART RATE: 81 BPM | BODY MASS INDEX: 20.35 KG/M2 | OXYGEN SATURATION: 97 % | TEMPERATURE: 97.5 F | DIASTOLIC BLOOD PRESSURE: 70 MMHG

## 2024-06-06 DIAGNOSIS — S82.891D CLOSED FRACTURE OF RIGHT ANKLE WITH ROUTINE HEALING, SUBSEQUENT ENCOUNTER: ICD-10-CM

## 2024-06-06 DIAGNOSIS — M54.50 CHRONIC BILATERAL LOW BACK PAIN WITHOUT SCIATICA: ICD-10-CM

## 2024-06-06 DIAGNOSIS — G89.29 CHRONIC BILATERAL LOW BACK PAIN WITHOUT SCIATICA: ICD-10-CM

## 2024-06-06 PROBLEM — S82.891A CLOSED FRACTURE OF RIGHT ANKLE: Status: ACTIVE | Noted: 2024-06-06

## 2024-06-06 LAB
ALBUMIN SERPL BCP-MCNC: 4.4 G/DL (ref 3.2–4.9)
ALBUMIN/GLOB SERPL: 1.6 G/DL
ALP SERPL-CCNC: 103 U/L (ref 30–99)
ALT SERPL-CCNC: 17 U/L (ref 2–50)
ANION GAP SERPL CALC-SCNC: 14 MMOL/L (ref 7–16)
AST SERPL-CCNC: 22 U/L (ref 12–45)
BILIRUB SERPL-MCNC: 0.4 MG/DL (ref 0.1–1.5)
BUN SERPL-MCNC: 15 MG/DL (ref 8–22)
CALCIUM ALBUM COR SERPL-MCNC: 9.4 MG/DL (ref 8.5–10.5)
CALCIUM SERPL-MCNC: 9.7 MG/DL (ref 8.5–10.5)
CHLORIDE SERPL-SCNC: 97 MMOL/L (ref 96–112)
CHOLEST SERPL-MCNC: 167 MG/DL (ref 100–199)
CO2 SERPL-SCNC: 26 MMOL/L (ref 20–33)
CREAT SERPL-MCNC: 0.56 MG/DL (ref 0.5–1.4)
GFR SERPLBLD CREATININE-BSD FMLA CKD-EPI: 98 ML/MIN/1.73 M 2
GLOBULIN SER CALC-MCNC: 2.8 G/DL (ref 1.9–3.5)
GLUCOSE SERPL-MCNC: 100 MG/DL (ref 65–99)
HDLC SERPL-MCNC: 60 MG/DL
LDLC SERPL CALC-MCNC: 94 MG/DL
POTASSIUM SERPL-SCNC: 4 MMOL/L (ref 3.6–5.5)
PROT SERPL-MCNC: 7.2 G/DL (ref 6–8.2)
SODIUM SERPL-SCNC: 137 MMOL/L (ref 135–145)
TRIGL SERPL-MCNC: 67 MG/DL (ref 0–149)

## 2024-06-06 PROCEDURE — 99214 OFFICE O/P EST MOD 30 MIN: CPT

## 2024-06-06 PROCEDURE — 3077F SYST BP >= 140 MM HG: CPT

## 2024-06-06 PROCEDURE — 36415 COLL VENOUS BLD VENIPUNCTURE: CPT

## 2024-06-06 PROCEDURE — 80053 COMPREHEN METABOLIC PANEL: CPT

## 2024-06-06 PROCEDURE — 3078F DIAST BP <80 MM HG: CPT

## 2024-06-06 PROCEDURE — 80061 LIPID PANEL: CPT

## 2024-06-06 RX ORDER — GABAPENTIN 300 MG/1
300 CAPSULE ORAL 2 TIMES DAILY
Qty: 180 CAPSULE | Refills: 3 | Status: SHIPPED | OUTPATIENT
Start: 2024-06-06

## 2024-06-06 RX ORDER — HYDROCHLOROTHIAZIDE 25 MG/1
25 TABLET ORAL DAILY
COMMUNITY

## 2024-06-06 RX ORDER — MELOXICAM 15 MG/1
15 TABLET ORAL DAILY
Qty: 90 TABLET | Refills: 3 | Status: SHIPPED | OUTPATIENT
Start: 2024-06-06

## 2024-06-06 RX ORDER — CYCLOBENZAPRINE HCL 10 MG
TABLET ORAL
Qty: 180 TABLET | Refills: 3 | Status: SHIPPED | OUTPATIENT
Start: 2024-06-06

## 2024-06-06 ASSESSMENT — ENCOUNTER SYMPTOMS
SHORTNESS OF BREATH: 0
MYALGIAS: 0
ABDOMINAL PAIN: 0
WEAKNESS: 0
WEIGHT LOSS: 0
BLURRED VISION: 0
DIARRHEA: 0
COUGH: 0
HEADACHES: 0
CONSTIPATION: 0
VOMITING: 0
FEVER: 0
DIZZINESS: 0
NAUSEA: 0
CHILLS: 0

## 2024-06-06 ASSESSMENT — PATIENT HEALTH QUESTIONNAIRE - PHQ9: CLINICAL INTERPRETATION OF PHQ2 SCORE: 0

## 2024-06-06 ASSESSMENT — FIBROSIS 4 INDEX: FIB4 SCORE: 1.42

## 2024-06-06 NOTE — TELEPHONE ENCOUNTER
DOCUMENTATION OF PAR STATUS:    1. Name of Medication & Dose: flexeril     2. Name of Prescription Coverage Company & phone #: [employer]    3. Date Prior Auth Submitted: 06/06/2024    4. What information was given to obtain insurance decision?     5. Prior Auth Status? Pending    6. Patient Notified: no

## 2024-06-06 NOTE — PROGRESS NOTES
Verbal consent was acquired by the patient to use [employer] ambient listening note generation during this visit  Subjective:     CC: Med refill    HPI:   Iram presents today with  History of Present Illness  The patient presents for medication refill.    The patient is seeking a refill of her Flexeril and Mobic prescriptions. In 11/2023, she experienced a fall in a hotel lobby due to tripping on a carpet, resulting in a double fracture of her right ankle. Despite the absence of bone rupture, she reports intermittent pain, swelling, and nerve-related pain, characterized by shooting, zapping, and electric shocks. She has undergone physical therapy and is currently managing her symptoms. Previously, she was on a regimen of gabapentin 900 mg thrice daily, which was later reduced to 600 mg twice daily. She is also on a daily regimen of Mobic. A recent consultation with her cardiologist revealed satisfactory blood pressure readings, although she notes that her blood pressure is typically higher during medical visits. For her muscle spasms in her back, she is prescribed Flexeril, taken twice daily. The pain typically commences after standing for 10 to 20 minutes, necessitating either sitting or lying down within 10 minutes due to intolerable pain. For her AFib, she takes metoprolol 25 mg as needed, with only 2 to 3 usage in the past few years. She has been on hydrochlorothiazide since 2020. She has never donated blood and has declined a mammogram this year. She is scheduled for lab work next Thursday with cardiology.   She is up-to-date on her COVID-19 and shingles vaccines.      Problem   Closed Fracture of Right Ankle         ROS:  Review of Systems   Constitutional:  Negative for chills, fever, malaise/fatigue and weight loss.   Eyes:  Negative for blurred vision.   Respiratory:  Negative for cough and shortness of breath.    Cardiovascular:  Negative for chest pain.   Gastrointestinal:  Negative for abdominal pain,  "constipation, diarrhea, nausea and vomiting.   Musculoskeletal:  Positive for joint pain. Negative for myalgias.   Neurological:  Negative for dizziness, weakness and headaches.       Objective:     Exam:  BP (!) 140/70 (BP Location: Left arm, Patient Position: Sitting, BP Cuff Size: Adult)   Pulse 81   Temp 36.4 °C (97.5 °F) (Temporal)   Ht 1.626 m (5' 4\")   Wt 54.1 kg (119 lb 3.2 oz)   SpO2 97%   BMI 20.46 kg/m²  Body mass index is 20.46 kg/m².    Physical Exam  Constitutional:       General: She is not in acute distress.     Appearance: Normal appearance. She is not ill-appearing or toxic-appearing.   HENT:      Head: Normocephalic.   Eyes:      Conjunctiva/sclera: Conjunctivae normal.   Cardiovascular:      Rate and Rhythm: Normal rate and regular rhythm.      Pulses: Normal pulses.      Heart sounds: Normal heart sounds. No murmur heard.  Pulmonary:      Effort: Pulmonary effort is normal. No respiratory distress.      Breath sounds: Normal breath sounds.   Skin:     General: Skin is warm and dry.   Neurological:      General: No focal deficit present.      Mental Status: She is alert and oriented to person, place, and time.   Psychiatric:         Mood and Affect: Mood normal.         Behavior: Behavior normal.         Labs:   Lab Results   Component Value Date/Time    CHOLSTRLTOT 175 06/07/2023 11:11 AM    LDL 94 06/07/2023 11:11 AM    HDL 69 06/07/2023 11:11 AM    TRIGLYCERIDE 59 06/07/2023 11:11 AM       Lab Results   Component Value Date/Time    SODIUM 138 06/07/2023 11:11 AM    POTASSIUM 4.0 06/07/2023 11:11 AM    CHLORIDE 101 06/07/2023 11:11 AM    CO2 24 06/07/2023 11:11 AM    GLUCOSE 91 06/07/2023 11:11 AM    BUN 17 06/07/2023 11:11 AM    CREATININE 0.64 06/07/2023 11:11 AM    CREATININE 1.0 06/03/2005 04:15 AM     Lab Results   Component Value Date/Time    ALKPHOSPHAT 81 06/07/2023 11:11 AM    ASTSGOT 26 06/07/2023 11:11 AM    ALTSGPT 19 06/07/2023 11:11 AM    TBILIRUBIN 0.8 06/07/2023 11:11 AM " "     Lab Results   Component Value Date/Time    HBA1C 5.5 02/06/2019 04:06 AM     No results found for: \"TSH\"  No results found for: \"FREET4\"  No results found for: \"CBC\"      Assessment & Plan: Medical Decision Making     69 y.o. female with the following -   Assessment & Plan  1. Medication refill.  The patient is advised to utilize compression stockings on days with significant swelling, elevate her leg during work hours, and apply ice to the affected area. A prescription for gabapentin 600 mg, to be taken twice daily, has been issued for a year's supply. The patient is also advised to take Mobic with food to mitigate potential kidney damage. A prescription for Flexeril has been issued for a year.    Follow-up  The patient is scheduled for a follow-up visit in 1 year.  1. Closed fracture of right ankle with routine healing, subsequent encounter  Chronic stable condition recommend continuation of medication regimen as follows  - gabapentin (NEURONTIN) 300 MG Cap; Take 1 Capsule by mouth 2 times a day.  Dispense: 180 Capsule; Refill: 3  - meloxicam (MOBIC) 15 MG tablet; Take 1 Tablet by mouth every day.  Dispense: 90 Tablet; Refill: 3    2. Chronic bilateral low back pain without sciatica  Chronic condition stable recommend continuation medication regimen as follows  - meloxicam (MOBIC) 15 MG tablet; Take 1 Tablet by mouth every day.  Dispense: 90 Tablet; Refill: 3  - cyclobenzaprine (FLEXERIL) 10 mg Tab; TAKE 1 TABLET BY MOUTH THREE TIMES DAILY AS NEEDED FOR MUSCLE SPASMS FOR UP TO 10 DAYS.  Dispense: 180 Tablet; Refill: 3        Differential diagnosis, natural history, supportive care, and indications for immediate follow-up discussed.  Shared decision making approach utilized, and patient is amendable with plan of care.  Patient understands to return to clinic or go to the emergency department if symptoms worsen. All questions and concerns addressed to the best of my knowledge.    Return in about 1 year (around " 6/6/2025).    Please note that this dictation was created using voice recognition software. I have made every reasonable attempt to correct obvious errors, but I expect that there are errors of grammar and possibly content that I did not discover before finalizing the note.

## 2024-06-06 NOTE — TELEPHONE ENCOUNTER
FINAL PRIOR AUTHORIZATION STATUS:    1.  Name of Medication & Dose: Cyclobenzaprine      2. Prior Auth Status: Approved through 01/01/2024-09/04/2024     3. Action Taken: Pharmacy Notified: no Patient Notified: no

## 2025-02-26 DIAGNOSIS — F51.01 PRIMARY INSOMNIA: ICD-10-CM

## 2025-02-26 RX ORDER — TRAZODONE HYDROCHLORIDE 100 MG/1
TABLET ORAL
Qty: 90 TABLET | Refills: 1 | Status: SHIPPED | OUTPATIENT
Start: 2025-02-26

## 2025-06-11 DIAGNOSIS — S82.891D CLOSED FRACTURE OF RIGHT ANKLE WITH ROUTINE HEALING, SUBSEQUENT ENCOUNTER: ICD-10-CM

## 2025-06-11 DIAGNOSIS — M54.50 CHRONIC BILATERAL LOW BACK PAIN WITHOUT SCIATICA: ICD-10-CM

## 2025-06-11 DIAGNOSIS — G89.29 CHRONIC BILATERAL LOW BACK PAIN WITHOUT SCIATICA: ICD-10-CM

## 2025-06-11 RX ORDER — MELOXICAM 15 MG/1
15 TABLET ORAL DAILY
Qty: 90 TABLET | Refills: 0 | Status: SHIPPED | OUTPATIENT
Start: 2025-06-11

## 2025-06-11 NOTE — TELEPHONE ENCOUNTER
Received request via: Patient    Was the patient seen in the last year in this department? Yes    Does the patient have an active prescription (recently filled or refills available) for medication(s) requested? No    Pharmacy Name: [facility] [address] [place] [address], [place] - [address]     Does the patient have skilled nursing Plus and need 100-day supply? (This applies to ALL medications) Patient does not have SCP